# Patient Record
Sex: MALE | Race: WHITE | Employment: OTHER | URBAN - METROPOLITAN AREA
[De-identification: names, ages, dates, MRNs, and addresses within clinical notes are randomized per-mention and may not be internally consistent; named-entity substitution may affect disease eponyms.]

---

## 2017-01-06 ENCOUNTER — OFFICE VISIT (OUTPATIENT)
Dept: FAMILY MEDICINE CLINIC | Age: 72
End: 2017-01-06

## 2017-01-06 VITALS
TEMPERATURE: 97.7 F | HEART RATE: 64 BPM | RESPIRATION RATE: 20 BRPM | BODY MASS INDEX: 32.34 KG/M2 | HEIGHT: 73 IN | SYSTOLIC BLOOD PRESSURE: 138 MMHG | DIASTOLIC BLOOD PRESSURE: 86 MMHG | WEIGHT: 244 LBS

## 2017-01-06 DIAGNOSIS — I42.9 CARDIOMYOPATHY (HCC): ICD-10-CM

## 2017-01-06 DIAGNOSIS — E11.9 TYPE 2 DIABETES MELLITUS WITHOUT COMPLICATION, WITHOUT LONG-TERM CURRENT USE OF INSULIN (HCC): Primary | ICD-10-CM

## 2017-01-06 DIAGNOSIS — E78.5 HYPERLIPIDEMIA, UNSPECIFIED HYPERLIPIDEMIA TYPE: ICD-10-CM

## 2017-01-06 DIAGNOSIS — I25.2 MI, OLD: ICD-10-CM

## 2017-01-06 DIAGNOSIS — Z95.1 HISTORY OF HEART BYPASS SURGERY: ICD-10-CM

## 2017-01-06 DIAGNOSIS — I10 ESSENTIAL HYPERTENSION: ICD-10-CM

## 2017-01-06 DIAGNOSIS — E66.9 OBESITY, UNSPECIFIED OBESITY SEVERITY, UNSPECIFIED OBESITY TYPE: ICD-10-CM

## 2017-01-06 DIAGNOSIS — E11.9 TYPE 2 DIABETES MELLITUS WITHOUT COMPLICATION, WITHOUT LONG-TERM CURRENT USE OF INSULIN (HCC): ICD-10-CM

## 2017-01-06 LAB
CREATININE URINE: 139.3 MG/DL
MICROALBUMIN UR-MCNC: 5.5 MG/DL
MICROALBUMIN/CREAT UR-RTO: 39.5 MG/G (ref 0–30)

## 2017-01-06 PROCEDURE — 99213 OFFICE O/P EST LOW 20 MIN: CPT | Performed by: FAMILY MEDICINE

## 2017-01-06 PROCEDURE — 93000 ELECTROCARDIOGRAM COMPLETE: CPT | Performed by: FAMILY MEDICINE

## 2017-01-06 RX ORDER — LOSARTAN POTASSIUM 100 MG/1
TABLET ORAL
Qty: 90 TABLET | Refills: 2 | Status: SHIPPED | OUTPATIENT
Start: 2017-01-06 | End: 2017-09-15 | Stop reason: SDUPTHER

## 2017-01-06 RX ORDER — POTASSIUM CHLORIDE 20 MEQ/1
20 TABLET, EXTENDED RELEASE ORAL DAILY
Qty: 6090 TABLET | Refills: 3 | Status: SHIPPED | OUTPATIENT
Start: 2017-01-06 | End: 2018-01-13 | Stop reason: SDUPTHER

## 2017-01-06 ASSESSMENT — ENCOUNTER SYMPTOMS
EYE DISCHARGE: 0
EYE ITCHING: 0
DIARRHEA: 0
ABDOMINAL PAIN: 0
SINUS PRESSURE: 0
SHORTNESS OF BREATH: 0
SORE THROAT: 0
CONSTIPATION: 0
COUGH: 0

## 2017-01-06 ASSESSMENT — PATIENT HEALTH QUESTIONNAIRE - PHQ9
1. LITTLE INTEREST OR PLEASURE IN DOING THINGS: 0
SUM OF ALL RESPONSES TO PHQ9 QUESTIONS 1 & 2: 0
SUM OF ALL RESPONSES TO PHQ QUESTIONS 1-9: 0
2. FEELING DOWN, DEPRESSED OR HOPELESS: 0

## 2017-01-10 ENCOUNTER — TELEPHONE (OUTPATIENT)
Dept: FAMILY MEDICINE CLINIC | Age: 72
End: 2017-01-10

## 2017-01-10 RX ORDER — PREDNISONE 10 MG/1
TABLET ORAL
Qty: 51 TABLET | Refills: 0 | Status: SHIPPED | OUTPATIENT
Start: 2017-01-10 | End: 2017-01-20

## 2017-02-01 RX ORDER — PRAVASTATIN SODIUM 20 MG
20 TABLET ORAL DAILY
Qty: 90 TABLET | Refills: 2 | Status: SHIPPED | OUTPATIENT
Start: 2017-02-01 | End: 2018-01-13 | Stop reason: SDUPTHER

## 2017-06-02 ENCOUNTER — OFFICE VISIT (OUTPATIENT)
Dept: FAMILY MEDICINE CLINIC | Age: 72
End: 2017-06-02

## 2017-06-02 VITALS
BODY MASS INDEX: 30.09 KG/M2 | HEART RATE: 65 BPM | SYSTOLIC BLOOD PRESSURE: 124 MMHG | DIASTOLIC BLOOD PRESSURE: 82 MMHG | HEIGHT: 73 IN | RESPIRATION RATE: 16 BRPM | WEIGHT: 227 LBS | TEMPERATURE: 97.5 F

## 2017-06-02 DIAGNOSIS — I10 ESSENTIAL HYPERTENSION: ICD-10-CM

## 2017-06-02 DIAGNOSIS — E78.5 HYPERLIPIDEMIA, UNSPECIFIED HYPERLIPIDEMIA TYPE: ICD-10-CM

## 2017-06-02 DIAGNOSIS — Z90.79 H/O ABDOMINAL PROSTATECTOMY: ICD-10-CM

## 2017-06-02 DIAGNOSIS — E11.9 TYPE 2 DIABETES MELLITUS WITHOUT COMPLICATION, WITHOUT LONG-TERM CURRENT USE OF INSULIN (HCC): ICD-10-CM

## 2017-06-02 DIAGNOSIS — E11.9 TYPE 2 DIABETES MELLITUS WITHOUT COMPLICATION, WITHOUT LONG-TERM CURRENT USE OF INSULIN (HCC): Primary | ICD-10-CM

## 2017-06-02 LAB
ALBUMIN SERPL-MCNC: 4.6 G/DL (ref 3.9–4.9)
ALP BLD-CCNC: 76 U/L (ref 35–104)
ALT SERPL-CCNC: 25 U/L (ref 0–41)
ANION GAP SERPL CALCULATED.3IONS-SCNC: 13 MEQ/L (ref 7–13)
AST SERPL-CCNC: 33 U/L (ref 0–40)
BASOPHILS ABSOLUTE: 0 K/UL (ref 0–0.2)
BASOPHILS RELATIVE PERCENT: 0.5 %
BILIRUB SERPL-MCNC: 0.5 MG/DL (ref 0–1.2)
BUN BLDV-MCNC: 49 MG/DL (ref 8–23)
CALCIUM SERPL-MCNC: 9.6 MG/DL (ref 8.6–10.2)
CHLORIDE BLD-SCNC: 99 MEQ/L (ref 98–107)
CHOLESTEROL, TOTAL: 262 MG/DL (ref 0–199)
CO2: 24 MEQ/L (ref 22–29)
CREAT SERPL-MCNC: 1.48 MG/DL (ref 0.7–1.2)
CREATININE URINE: 323.5 MG/DL
EOSINOPHILS ABSOLUTE: 0.3 K/UL (ref 0–0.7)
EOSINOPHILS RELATIVE PERCENT: 4.9 %
GFR AFRICAN AMERICAN: 56.5
GFR NON-AFRICAN AMERICAN: 46.7
GLOBULIN: 2.4 G/DL (ref 2.3–3.5)
GLUCOSE BLD-MCNC: 83 MG/DL (ref 74–109)
HBA1C MFR BLD: 5.6 % (ref 4.8–5.9)
HCT VFR BLD CALC: 45.9 % (ref 42–52)
HDLC SERPL-MCNC: 55 MG/DL (ref 40–59)
HEMOGLOBIN: 15.2 G/DL (ref 14–18)
LDL CHOLESTEROL CALCULATED: 181 MG/DL (ref 0–129)
LYMPHOCYTES ABSOLUTE: 1.5 K/UL (ref 1–4.8)
LYMPHOCYTES RELATIVE PERCENT: 21.8 %
MCH RBC QN AUTO: 34.4 PG (ref 27–31.3)
MCHC RBC AUTO-ENTMCNC: 33 % (ref 33–37)
MCV RBC AUTO: 104.2 FL (ref 80–100)
MICROALBUMIN UR-MCNC: 3.6 MG/DL
MICROALBUMIN/CREAT UR-RTO: 11.1 MG/G (ref 0–30)
MONOCYTES ABSOLUTE: 0.8 K/UL (ref 0.2–0.8)
MONOCYTES RELATIVE PERCENT: 12.1 %
NEUTROPHILS ABSOLUTE: 4.1 K/UL (ref 1.4–6.5)
NEUTROPHILS RELATIVE PERCENT: 60.7 %
PDW BLD-RTO: 15.4 % (ref 11.5–14.5)
PLATELET # BLD: 170 K/UL (ref 130–400)
POTASSIUM SERPL-SCNC: 5.4 MEQ/L (ref 3.5–5.1)
PROSTATE SPECIFIC ANTIGEN: <0.01 NG/ML (ref 0–6.22)
RBC # BLD: 4.41 M/UL (ref 4.7–6.1)
SODIUM BLD-SCNC: 136 MEQ/L (ref 132–144)
TOTAL PROTEIN: 7 G/DL (ref 6.4–8.1)
TRIGL SERPL-MCNC: 128 MG/DL (ref 0–200)
WBC # BLD: 6.8 K/UL (ref 4.8–10.8)

## 2017-06-02 PROCEDURE — 3288F FALL RISK ASSESSMENT DOCD: CPT | Performed by: FAMILY MEDICINE

## 2017-06-02 PROCEDURE — 99214 OFFICE O/P EST MOD 30 MIN: CPT | Performed by: FAMILY MEDICINE

## 2017-06-02 RX ORDER — METOPROLOL TARTRATE 50 MG/1
TABLET, FILM COATED ORAL
Qty: 60 TABLET | Refills: 1 | Status: SHIPPED | OUTPATIENT
Start: 2017-06-02 | End: 2017-12-04 | Stop reason: SDUPTHER

## 2017-06-02 RX ORDER — METOPROLOL TARTRATE 50 MG/1
50 TABLET, FILM COATED ORAL 2 TIMES DAILY
Qty: 180 TABLET | Refills: 3 | Status: SHIPPED | OUTPATIENT
Start: 2017-06-02 | End: 2018-07-19 | Stop reason: SDUPTHER

## 2017-06-02 RX ORDER — LIDOCAINE 50 MG/G
1 PATCH TOPICAL DAILY
Qty: 30 PATCH | Refills: 0 | Status: SHIPPED | OUTPATIENT
Start: 2017-06-02 | End: 2018-07-05

## 2017-06-02 RX ORDER — FUROSEMIDE 40 MG/1
40 TABLET ORAL DAILY
Qty: 30 TABLET | Refills: 5 | Status: SHIPPED | OUTPATIENT
Start: 2017-06-02 | End: 2018-07-05 | Stop reason: ALTCHOICE

## 2017-06-02 ASSESSMENT — ENCOUNTER SYMPTOMS
ABDOMINAL PAIN: 0
SORE THROAT: 0
SINUS PRESSURE: 0
EYE ITCHING: 0
EYE DISCHARGE: 0
COUGH: 0
DIARRHEA: 0
SHORTNESS OF BREATH: 0
CONSTIPATION: 0

## 2017-07-25 ENCOUNTER — OFFICE VISIT (OUTPATIENT)
Dept: FAMILY MEDICINE CLINIC | Age: 72
End: 2017-07-25

## 2017-07-25 VITALS
TEMPERATURE: 97.9 F | OXYGEN SATURATION: 97 % | BODY MASS INDEX: 31.94 KG/M2 | HEIGHT: 73 IN | HEART RATE: 78 BPM | RESPIRATION RATE: 16 BRPM | DIASTOLIC BLOOD PRESSURE: 80 MMHG | SYSTOLIC BLOOD PRESSURE: 124 MMHG | WEIGHT: 241 LBS

## 2017-07-25 DIAGNOSIS — M25.512 ACUTE PAIN OF LEFT SHOULDER: Primary | ICD-10-CM

## 2017-07-25 PROCEDURE — 99213 OFFICE O/P EST LOW 20 MIN: CPT | Performed by: FAMILY MEDICINE

## 2017-07-25 RX ORDER — PREDNISONE 10 MG/1
TABLET ORAL
Qty: 51 TABLET | Refills: 0 | Status: SHIPPED | OUTPATIENT
Start: 2017-07-25 | End: 2017-08-04

## 2017-07-25 ASSESSMENT — ENCOUNTER SYMPTOMS
SHORTNESS OF BREATH: 0
WHEEZING: 0
NAUSEA: 0
SORE THROAT: 0
SINUS PRESSURE: 0
DIARRHEA: 0
CHEST TIGHTNESS: 0
RHINORRHEA: 0
COUGH: 0
ABDOMINAL PAIN: 0
VOMITING: 0

## 2017-08-08 ENCOUNTER — OFFICE VISIT (OUTPATIENT)
Dept: FAMILY MEDICINE CLINIC | Age: 72
End: 2017-08-08

## 2017-08-08 VITALS
HEART RATE: 72 BPM | BODY MASS INDEX: 31.94 KG/M2 | WEIGHT: 241 LBS | RESPIRATION RATE: 14 BRPM | SYSTOLIC BLOOD PRESSURE: 122 MMHG | HEIGHT: 73 IN | DIASTOLIC BLOOD PRESSURE: 74 MMHG | TEMPERATURE: 96.4 F

## 2017-08-08 DIAGNOSIS — G89.29 CHRONIC LEFT SHOULDER PAIN: Primary | ICD-10-CM

## 2017-08-08 DIAGNOSIS — M62.838 MUSCLE SPASM: ICD-10-CM

## 2017-08-08 DIAGNOSIS — M25.512 CHRONIC LEFT SHOULDER PAIN: Primary | ICD-10-CM

## 2017-08-08 DIAGNOSIS — M67.40 GANGLION CYST: ICD-10-CM

## 2017-08-08 DIAGNOSIS — M19.041 PRIMARY OSTEOARTHRITIS OF RIGHT HAND: ICD-10-CM

## 2017-08-08 PROCEDURE — 99214 OFFICE O/P EST MOD 30 MIN: CPT | Performed by: FAMILY MEDICINE

## 2017-08-08 RX ORDER — PREDNISONE 10 MG/1
TABLET ORAL
Qty: 51 TABLET | Refills: 0 | Status: SHIPPED | OUTPATIENT
Start: 2017-08-08 | End: 2017-08-18

## 2017-08-08 ASSESSMENT — ENCOUNTER SYMPTOMS
CHEST TIGHTNESS: 0
COUGH: 0
SORE THROAT: 0
NAUSEA: 0
SHORTNESS OF BREATH: 0
SINUS PRESSURE: 0
ABDOMINAL PAIN: 0
VOMITING: 0
DIARRHEA: 0
WHEEZING: 0
RHINORRHEA: 0

## 2017-08-11 ENCOUNTER — TELEPHONE (OUTPATIENT)
Dept: FAMILY MEDICINE CLINIC | Age: 72
End: 2017-08-11

## 2017-09-15 RX ORDER — LOSARTAN POTASSIUM 100 MG/1
TABLET ORAL
Qty: 90 TABLET | Refills: 3 | Status: SHIPPED | OUTPATIENT
Start: 2017-09-15 | End: 2018-08-30 | Stop reason: SDUPTHER

## 2017-10-30 LAB
BASOPHILS ABSOLUTE: NORMAL /ΜL
BASOPHILS RELATIVE PERCENT: NORMAL %
EOSINOPHILS ABSOLUTE: NORMAL /ΜL
EOSINOPHILS RELATIVE PERCENT: NORMAL %
HCT VFR BLD CALC: 41.5 % (ref 41–53)
HEMOGLOBIN: 13.6 G/DL (ref 13.5–17.5)
LYMPHOCYTES ABSOLUTE: NORMAL /ΜL
LYMPHOCYTES RELATIVE PERCENT: NORMAL %
MCH RBC QN AUTO: NORMAL PG
MCHC RBC AUTO-ENTMCNC: NORMAL G/DL
MCV RBC AUTO: NORMAL FL
MONOCYTES ABSOLUTE: NORMAL /ΜL
MONOCYTES RELATIVE PERCENT: NORMAL %
NEUTROPHILS ABSOLUTE: NORMAL /ΜL
NEUTROPHILS RELATIVE PERCENT: NORMAL %
PDW BLD-RTO: NORMAL %
PLATELET # BLD: 220 K/ΜL
PMV BLD AUTO: NORMAL FL
RBC # BLD: 4.1 10^6/ΜL
WBC # BLD: 6.5 10^3/ML

## 2017-12-04 ENCOUNTER — OFFICE VISIT (OUTPATIENT)
Dept: FAMILY MEDICINE CLINIC | Age: 72
End: 2017-12-04

## 2017-12-04 VITALS
HEART RATE: 72 BPM | HEIGHT: 73 IN | TEMPERATURE: 98.2 F | RESPIRATION RATE: 16 BRPM | WEIGHT: 242 LBS | BODY MASS INDEX: 32.07 KG/M2 | SYSTOLIC BLOOD PRESSURE: 132 MMHG | DIASTOLIC BLOOD PRESSURE: 82 MMHG

## 2017-12-04 DIAGNOSIS — R60.9 SWELLING: ICD-10-CM

## 2017-12-04 DIAGNOSIS — M79.18 RIGHT BUTTOCK PAIN: Primary | ICD-10-CM

## 2017-12-04 PROCEDURE — 99213 OFFICE O/P EST LOW 20 MIN: CPT | Performed by: FAMILY MEDICINE

## 2017-12-04 ASSESSMENT — ENCOUNTER SYMPTOMS
SINUS PRESSURE: 0
EYE ITCHING: 0
SORE THROAT: 0
CONSTIPATION: 0
EYE DISCHARGE: 0
SHORTNESS OF BREATH: 0
DIARRHEA: 0
ABDOMINAL PAIN: 0
COUGH: 0

## 2017-12-04 NOTE — PROGRESS NOTES
Subjective  Moustapha Hernandez, 67 y.o. male presents today with:  Chief Complaint   Patient presents with    Fall     fell down stairs 11/25/17, point of impact: rt buttock. Area is very swollen and bruied. Pain is moderate to severe            HPI    Patient slipped and went straight down on his right buttock cheek on steps 9 days ago still has pain tenderness and a large swollen area. No other questions and or concerns for today's visit      Review of Systems   Constitutional: Negative for appetite change, fatigue and fever. HENT: Negative for congestion, ear pain, sinus pressure and sore throat. Eyes: Negative for discharge and itching. Respiratory: Negative for cough and shortness of breath. Cardiovascular: Negative for chest pain and palpitations. Gastrointestinal: Negative for abdominal pain, constipation and diarrhea. Endocrine: Negative for polydipsia. Genitourinary: Negative for difficulty urinating. Musculoskeletal: Positive for arthralgias and myalgias. Negative for gait problem. Skin: Negative. Neurological: Negative for dizziness. Hematological: Negative. Psychiatric/Behavioral: Negative.           Past Medical History:   Diagnosis Date    Anxiety     BPH (benign prostatic hypertrophy)     BPH (benign prostatic hypertrophy) 4/27/2015    Cervical spinal stenosis 3,4, and 5 6/21/2013    ETOH abuse     Gout     History of heart bypass surgery 8/10/2012    Hyperlipidemia     Hyperlipidemia 1/6/2017    Hypertension     Hypogonadism     MI, old     discovered MI in 2012, occured prior to 2007    Obesity     Type II or unspecified type diabetes mellitus without mention of complication, not stated as uncontrolled      Past Surgical History:   Procedure Laterality Date    APPENDECTOMY      BACK SURGERY  05/15/14    Spinal stenosis, laminectomy partial L1 complete L2 L3 L4 partial L5    BICEPS TENDON REPAIR      BILAT RUPTURE    CARDIAC CATHETERIZATION 11/06/2002    LEFT    CARDIAC SURGERY  07-19-12    [facility], [facility] 5 Dr. Della Patricio  9/18/2013    CATARACT REMOVAL WITH IMPLANT  11/2009    RIGHT    CATARACT REMOVAL WITH IMPLANT  02/2017    left eye    COLONOSCOPY  1997    COLONOSCOPY  06/04/15    DR Arlene Mendoza    5 YRS    CRUCIATE LIGAMENT REPAIR  09/14/15    DR Linden Wright    KNEE SURGERY  09/2015    left knee partial knee replacement    OTHER SURGICAL HISTORY  11/8/2013    EPIDURAL STEROID INJ./LUMBAR     PROSTATE SURGERY      prostate removal     PROSTATECTOMY  02/01/16    DR Fidencio Perdue RETINAL DETACHMENT SURGERY      right eye     SHOULDER SURGERY      [facility]     Social History     Social History    Marital status:      Spouse name: Marysue Favre     Number of children: N/A    Years of education: N/A     Occupational History     Retired     Social History Main Topics    Smoking status: Never Smoker    Smokeless tobacco: Never Used    Alcohol use Yes    Drug use: No    Sexual activity: Not on file     Other Topics Concern    Not on file     Social History Narrative    No narrative on file     Family History   Problem Relation Age of Onset    Heart Disease Father      MI     Allergies   Allergen Reactions    Shellfish Allergy      Other reaction(s): Vomiting    Statins Other (See Comments)     Lipitor 40 mg caused severe muscle pains , weakness occurred after 5 years of taking the medication and became significantly worse. Pain corrected immediately after stopping Lipitor.      Sulfa Antibiotics Rash     Skin irritation     Current Outpatient Prescriptions   Medication Sig Dispense Refill    losartan (COZAAR) 100 MG tablet TAKE 1 TABLET DAILY 90 tablet 3    furosemide (LASIX) 40 MG tablet Take 1 tablet by mouth daily One tablet weekly 30 tablet 5    metoprolol tartrate (LOPRESSOR) 50 MG tablet TAKE 1 TABLET TWICE A DAY 60 tablet 1    metoprolol tartrate (LOPRESSOR) 50 MG tablet Take 1 tablet by mouth 2 times daily 180 tablet 3    lidocaine (LIDODERM) 5 % Place 1 patch onto the skin daily 12 hours on, 12 hours off. 30 patch 0    pravastatin (PRAVACHOL) 20 MG tablet Take 1 tablet by mouth daily 90 tablet 2    potassium chloride (KLOR-CON M) 20 MEQ extended release tablet Take 1 tablet by mouth daily 6090 tablet 3    Apoaequorin (PREVAGEN) 10 MG CAPS Take 10 mg by mouth daily 30 capsule 3    allopurinol (ZYLOPRIM) 300 MG tablet TAKE 1 TABLET DAILY 90 tablet 1    Magnesium 500 MG TABS Take by mouth 2 times daily      aspirin 81 MG tablet Take 81 mg by mouth 2 times daily.  KLOR-CON M20 20 MEQ tablet TAKE 1 TABLET DAILY 90 tablet 2    Plant Sterols and Stanols (CHOLEST OFF PO) Take 900 mg by mouth 2 times daily.  Cholecalciferol (VITAMIN D-3) 5000 UNITS TABS Take  by mouth.  glucose blood VI test strips ([employer] CONTOUR TEST) strip by In Vitro route as needed. Test bid       MICROLET LANCETS MISC by Does not apply route. bid         fish oil-omega-3 fatty acids 1000 MG capsule Take 1 g by mouth every other day.  Multiple Vitamin (MULTIVITAMIN PO) Take  by mouth daily. No current facility-administered medications for this visit. PMH, Surgical Hx, Family Hx, and Social Hx reviewed and updated. Health Maintenance reviewed. Objective    There were no vitals filed for this visit. Physical Exam   Constitutional: He is oriented to person, place, and time. He appears well-developed and well-nourished. HENT:   Head: Normocephalic and atraumatic. Right Ear: External ear normal.   Left Ear: External ear normal.   Mouth/Throat: Oropharynx is clear and moist.   Eyes: Conjunctivae and EOM are normal. Pupils are equal, round, and reactive to light. Neck: Normal range of motion. Neck supple. No JVD present. No thyromegaly present. Cardiovascular: Normal rate, regular rhythm, normal heart sounds and intact distal pulses. Exam reveals no gallop and no friction rub.     No murmur heard.  Pulmonary/Chest: Effort normal and breath sounds normal. No respiratory distress. Abdominal: Bowel sounds are normal. He exhibits no mass. There is no tenderness. Musculoskeletal: Normal range of motion. Neurological: He is alert and oriented to person, place, and time. Skin: Skin is warm and dry. Psychiatric: He has a normal mood and affect. His behavior is normal.     Physical exam the right buttock is bruised this happened injury up in 9 days ago he has a large softball size ballotable collection it is non-transilluminating. I suspect that this is a large collection of blood. We'll switch causing the continued pain. Patient states that this is not shrunk down appreciably since she didn't day after the injury. He would prefer to go to the clinic clinic we will contact her surgery Department and see who we can get him in to see as soon as possible and will probably need to be drained that may be semi-soft clot in which case we'll have to be opened up. We'll await surgeon's evaluation of follow-up with him after the procedure. Assessment & Plan   1. Right buttock pain     2. Swelling       No orders of the defined types were placed in this encounter. No orders of the defined types were placed in this encounter. Medications Discontinued During This Encounter   Medication Reason    metoprolol tartrate (LOPRESSOR) 50 MG tablet Duplicate Order     No Follow-up on file.         Controlled Substances Monitoring:          Amadeo Zaragoza MD          \

## 2018-01-15 RX ORDER — POTASSIUM CHLORIDE 20 MEQ/1
TABLET, EXTENDED RELEASE ORAL
Qty: 6090 TABLET | Refills: 3 | Status: ON HOLD | OUTPATIENT
Start: 2018-01-15 | End: 2018-07-31

## 2018-01-15 RX ORDER — PRAVASTATIN SODIUM 20 MG
TABLET ORAL
Qty: 90 TABLET | Refills: 2 | Status: SHIPPED | OUTPATIENT
Start: 2018-01-15 | End: 2019-02-18 | Stop reason: SDUPTHER

## 2018-01-23 ENCOUNTER — TELEPHONE (OUTPATIENT)
Dept: FAMILY MEDICINE CLINIC | Age: 73
End: 2018-01-23

## 2018-01-23 RX ORDER — PREDNISONE 10 MG/1
TABLET ORAL
Qty: 51 TABLET | Refills: 0 | Status: SHIPPED | OUTPATIENT
Start: 2018-01-23 | End: 2018-02-02

## 2018-07-05 ENCOUNTER — OFFICE VISIT (OUTPATIENT)
Dept: FAMILY MEDICINE CLINIC | Age: 73
End: 2018-07-05
Payer: MEDICARE

## 2018-07-05 VITALS
SYSTOLIC BLOOD PRESSURE: 138 MMHG | RESPIRATION RATE: 18 BRPM | TEMPERATURE: 97 F | BODY MASS INDEX: 32.15 KG/M2 | WEIGHT: 242.6 LBS | DIASTOLIC BLOOD PRESSURE: 88 MMHG | HEIGHT: 73 IN | HEART RATE: 88 BPM

## 2018-07-05 DIAGNOSIS — K52.9 CHRONIC DIARRHEA: ICD-10-CM

## 2018-07-05 DIAGNOSIS — C61 PROSTATE CANCER (HCC): ICD-10-CM

## 2018-07-05 DIAGNOSIS — Z00.00 ROUTINE GENERAL MEDICAL EXAMINATION AT A HEALTH CARE FACILITY: Primary | ICD-10-CM

## 2018-07-05 DIAGNOSIS — I25.10 ATHEROSCLEROSIS OF CORONARY ARTERY, ANGINA PRESENCE UNSPECIFIED, UNSPECIFIED VESSEL OR LESION TYPE, UNSPECIFIED WHETHER NATIVE OR TRANSPLANTED HEART: ICD-10-CM

## 2018-07-05 DIAGNOSIS — E11.9 TYPE 2 DIABETES MELLITUS WITHOUT COMPLICATION, WITHOUT LONG-TERM CURRENT USE OF INSULIN (HCC): ICD-10-CM

## 2018-07-05 DIAGNOSIS — Z12.11 SCREENING FOR COLON CANCER: ICD-10-CM

## 2018-07-05 DIAGNOSIS — I10 ESSENTIAL HYPERTENSION: ICD-10-CM

## 2018-07-05 LAB
ALBUMIN SERPL-MCNC: 4 G/DL (ref 3.9–4.9)
ALP BLD-CCNC: 77 U/L (ref 35–104)
ALT SERPL-CCNC: 21 U/L (ref 0–41)
ANION GAP SERPL CALCULATED.3IONS-SCNC: 12 MEQ/L (ref 7–13)
AST SERPL-CCNC: 22 U/L (ref 0–40)
BASOPHILS ABSOLUTE: 0 K/UL (ref 0–0.2)
BASOPHILS RELATIVE PERCENT: 0.6 %
BILIRUB SERPL-MCNC: 0.5 MG/DL (ref 0–1.2)
BUN BLDV-MCNC: 20 MG/DL (ref 8–23)
CALCIUM SERPL-MCNC: 9.5 MG/DL (ref 8.6–10.2)
CHLORIDE BLD-SCNC: 103 MEQ/L (ref 98–107)
CHOLESTEROL, TOTAL: 232 MG/DL (ref 0–199)
CO2: 27 MEQ/L (ref 22–29)
CREAT SERPL-MCNC: 0.86 MG/DL (ref 0.7–1.2)
CREATININE URINE: 188.6 MG/DL
EOSINOPHILS ABSOLUTE: 0.3 K/UL (ref 0–0.7)
EOSINOPHILS RELATIVE PERCENT: 5 %
GFR AFRICAN AMERICAN: >60
GFR NON-AFRICAN AMERICAN: >60
GLOBULIN: 2.7 G/DL (ref 2.3–3.5)
GLUCOSE BLD-MCNC: 117 MG/DL (ref 74–109)
HBA1C MFR BLD: 6.5 % (ref 4.8–5.9)
HCT VFR BLD CALC: 42.3 % (ref 42–52)
HDLC SERPL-MCNC: 59 MG/DL (ref 40–59)
HEMOGLOBIN: 14.7 G/DL (ref 14–18)
LDL CHOLESTEROL CALCULATED: 150 MG/DL (ref 0–129)
LYMPHOCYTES ABSOLUTE: 1.8 K/UL (ref 1–4.8)
LYMPHOCYTES RELATIVE PERCENT: 28.3 %
MCH RBC QN AUTO: 34.8 PG (ref 27–31.3)
MCHC RBC AUTO-ENTMCNC: 34.8 % (ref 33–37)
MCV RBC AUTO: 99.9 FL (ref 80–100)
MICROALBUMIN UR-MCNC: 27.9 MG/DL
MICROALBUMIN/CREAT UR-RTO: 147.9 MG/G (ref 0–30)
MONOCYTES ABSOLUTE: 0.7 K/UL (ref 0.2–0.8)
MONOCYTES RELATIVE PERCENT: 11.6 %
NEUTROPHILS ABSOLUTE: 3.4 K/UL (ref 1.4–6.5)
NEUTROPHILS RELATIVE PERCENT: 54.5 %
PDW BLD-RTO: 14.4 % (ref 11.5–14.5)
PLATELET # BLD: 197 K/UL (ref 130–400)
POTASSIUM SERPL-SCNC: 4.4 MEQ/L (ref 3.5–5.1)
PROSTATE SPECIFIC ANTIGEN: 0.01 NG/ML (ref 0–6.22)
RBC # BLD: 4.23 M/UL (ref 4.7–6.1)
SODIUM BLD-SCNC: 142 MEQ/L (ref 132–144)
TOTAL PROTEIN: 6.7 G/DL (ref 6.4–8.1)
TRIGL SERPL-MCNC: 115 MG/DL (ref 0–200)
WBC # BLD: 6.2 K/UL (ref 4.8–10.8)

## 2018-07-05 PROCEDURE — 99397 PER PM REEVAL EST PAT 65+ YR: CPT | Performed by: FAMILY MEDICINE

## 2018-07-05 RX ORDER — DONEPEZIL HYDROCHLORIDE 10 MG/1
10 TABLET, FILM COATED ORAL NIGHTLY
Qty: 30 TABLET | Refills: 3 | Status: SHIPPED | OUTPATIENT
Start: 2018-07-05 | End: 2018-09-26

## 2018-07-05 ASSESSMENT — ENCOUNTER SYMPTOMS
SINUS PRESSURE: 0
EYE ITCHING: 0
ABDOMINAL PAIN: 0
EYE DISCHARGE: 0
SORE THROAT: 0
SHORTNESS OF BREATH: 0
DIARRHEA: 0
CONSTIPATION: 0

## 2018-07-05 NOTE — PROGRESS NOTES
Panel    Lipid Panel    Hemoglobin A1C    Microalbumin / Creatinine Urine Ratio   2. Essential hypertension  CBC With Auto Differential    Comprehensive Metabolic Panel    Lipid Panel   3. Atherosclerosis of coronary artery, angina presence unspecified, unspecified vessel or lesion type, unspecified whether native or transplanted heart  CBC With Auto Differential    Comprehensive Metabolic Panel    Lipid Panel   4. Screening for colon cancer  [address] [place] [address])   5. Prostate cancer (HCC)  PSA, Diagnostic   6.  Chronic diarrhea  Amb External Referral To Gastroenterology     Orders Placed This Encounter   Procedures    CBC With Auto Differential     Standing Status:   Future     Number of Occurrences:   1     Standing Expiration Date:   7/5/2019    Comprehensive Metabolic Panel     Standing Status:   Future     Number of Occurrences:   1     Standing Expiration Date:   7/5/2019    Lipid Panel     Standing Status:   Future     Number of Occurrences:   1     Standing Expiration Date:   7/5/2019     Order Specific Question:   Is Patient Fasting?/# of Hours     Answer:   yes/8    Hemoglobin A1C     Standing Status:   Future     Number of Occurrences:   1     Standing Expiration Date:   7/5/2019    Microalbumin / Creatinine Urine Ratio     Standing Status:   Future     Number of Occurrences:   1     Standing Expiration Date:   7/5/2019    PSA, Diagnostic     Standing Status:   Future     Number of Occurrences:   1     Standing Expiration Date:   7/5/2019   [address] [place] [address]     Referral Priority:   Routine     Referral Type:   Eval and Treat     Referral Reason:   Specialty Services Required     Requested Specialty:   Gastroenterology     Number of Visits Requested:   1    Amb External Referral To Gastroenterology     Referral Priority:   Routine     Referral Type:   Consult for Advice and Opinion     Referral Reason: Specialty Services Required     Referred to Provider:   Enio Holm MD     Requested Specialty:   Gastroenterology     Number of Visits Requested:   1     Orders Placed This Encounter   Medications    donepezil (ARICEPT) 10 MG tablet     Sig: Take 1 tablet by mouth nightly     Dispense:  30 tablet     Refill:  3     Medications Discontinued During This Encounter   Medication Reason    furosemide (LASIX) 40 MG tablet Therapy completed    lidocaine (LIDODERM) 5 % LIST CLEANUP    Multiple Vitamin (MULTIVITAMIN PO) LIST CLEANUP     No Follow-up on file. Controlled Substances Monitoring:     No flowsheet data found.     Farhana Oakes MD

## 2018-07-19 RX ORDER — METOPROLOL TARTRATE 50 MG/1
50 TABLET, FILM COATED ORAL 2 TIMES DAILY
Qty: 180 TABLET | Refills: 3 | Status: SHIPPED | OUTPATIENT
Start: 2018-07-19

## 2018-07-31 ENCOUNTER — ANESTHESIA (OUTPATIENT)
Dept: ENDOSCOPY | Age: 73
End: 2018-07-31
Payer: MEDICARE

## 2018-07-31 ENCOUNTER — ANESTHESIA EVENT (OUTPATIENT)
Dept: ENDOSCOPY | Age: 73
End: 2018-07-31
Payer: MEDICARE

## 2018-07-31 ENCOUNTER — HOSPITAL ENCOUNTER (OUTPATIENT)
Age: 73
Setting detail: OUTPATIENT SURGERY
Discharge: HOME | End: 2018-07-31
Attending: SPECIALIST | Admitting: SPECIALIST
Payer: MEDICARE

## 2018-07-31 VITALS
RESPIRATION RATE: 16 BRPM | OXYGEN SATURATION: 99 % | HEART RATE: 50 BPM | BODY MASS INDEX: 31.28 KG/M2 | TEMPERATURE: 97.6 F | DIASTOLIC BLOOD PRESSURE: 74 MMHG | SYSTOLIC BLOOD PRESSURE: 126 MMHG | HEIGHT: 73 IN | WEIGHT: 236 LBS

## 2018-07-31 VITALS
SYSTOLIC BLOOD PRESSURE: 156 MMHG | DIASTOLIC BLOOD PRESSURE: 74 MMHG | RESPIRATION RATE: 20 BRPM | OXYGEN SATURATION: 99 %

## 2018-07-31 PROCEDURE — 88305 TISSUE EXAM BY PATHOLOGIST: CPT

## 2018-07-31 PROCEDURE — 3700000001 HC ADD 15 MINUTES (ANESTHESIA): Performed by: SPECIALIST

## 2018-07-31 PROCEDURE — 3700000000 HC ANESTHESIA ATTENDED CARE: Performed by: SPECIALIST

## 2018-07-31 PROCEDURE — 7100000011 HC PHASE II RECOVERY - ADDTL 15 MIN: Performed by: SPECIALIST

## 2018-07-31 PROCEDURE — 7100000010 HC PHASE II RECOVERY - FIRST 15 MIN: Performed by: SPECIALIST

## 2018-07-31 PROCEDURE — 6360000002 HC RX W HCPCS: Performed by: NURSE ANESTHETIST, CERTIFIED REGISTERED

## 2018-07-31 PROCEDURE — 2580000003 HC RX 258: Performed by: SPECIALIST

## 2018-07-31 PROCEDURE — 3609027000 HC COLONOSCOPY: Performed by: SPECIALIST

## 2018-07-31 RX ORDER — SODIUM CHLORIDE 9 MG/ML
INJECTION, SOLUTION INTRAVENOUS CONTINUOUS
Status: DISCONTINUED | OUTPATIENT
Start: 2018-07-31 | End: 2018-07-31 | Stop reason: HOSPADM

## 2018-07-31 RX ORDER — LIDOCAINE HYDROCHLORIDE 10 MG/ML
1 INJECTION, SOLUTION EPIDURAL; INFILTRATION; INTRACAUDAL; PERINEURAL
Status: DISCONTINUED | OUTPATIENT
Start: 2018-07-31 | End: 2018-07-31 | Stop reason: HOSPADM

## 2018-07-31 RX ORDER — SODIUM CHLORIDE 0.9 % (FLUSH) 0.9 %
10 SYRINGE (ML) INJECTION EVERY 12 HOURS SCHEDULED
Status: DISCONTINUED | OUTPATIENT
Start: 2018-07-31 | End: 2018-07-31 | Stop reason: HOSPADM

## 2018-07-31 RX ORDER — SODIUM CHLORIDE 0.9 % (FLUSH) 0.9 %
10 SYRINGE (ML) INJECTION PRN
Status: DISCONTINUED | OUTPATIENT
Start: 2018-07-31 | End: 2018-07-31 | Stop reason: HOSPADM

## 2018-07-31 RX ORDER — VIT C/B6/B5/MAGNESIUM/HERB 173 50-5-6-5MG
500 CAPSULE ORAL DAILY
COMMUNITY

## 2018-07-31 RX ORDER — PROPOFOL 10 MG/ML
INJECTION, EMULSION INTRAVENOUS PRN
Status: DISCONTINUED | OUTPATIENT
Start: 2018-07-31 | End: 2018-07-31 | Stop reason: SDUPTHER

## 2018-07-31 RX ORDER — M-VIT,TX,IRON,MINS/CALC/FOLIC 27MG-0.4MG
1 TABLET ORAL DAILY
COMMUNITY

## 2018-07-31 RX ORDER — GLUCOSAMINE HCL 500 MG
400 TABLET ORAL DAILY
COMMUNITY

## 2018-07-31 RX ADMIN — PROPOFOL 70 MG: 10 INJECTION, EMULSION INTRAVENOUS at 13:15

## 2018-07-31 RX ADMIN — SODIUM CHLORIDE: 9 INJECTION, SOLUTION INTRAVENOUS at 12:19

## 2018-07-31 RX ADMIN — PROPOFOL 200 MG: 10 INJECTION, EMULSION INTRAVENOUS at 13:03

## 2018-07-31 NOTE — ANESTHESIA PRE PROCEDURE
Department of Anesthesiology  Preprocedure Note       Name:  Mikey Aldrich   Age:  68 y.o.  :  1945                                          MRN:  [id number]         Date:  2018      Surgeon: Viktoriya Santos):  Pat Ram MD    Procedure: Procedure(s):  COLONOSCOPY    Medications prior to admission:   Prior to Admission medications    Medication Sig Start Date End Date Taking? Authorizing Provider   Probiotic Product (PROBIOTIC-10 PO) Take 1 tablet by mouth daily   Yes Historical Provider, MD   psyllium 0.52 g capsule Take 0.52 g by mouth daily   Yes Historical Provider, MD   metoprolol tartrate (LOPRESSOR) 50 MG tablet Take 1 tablet by mouth 2 times daily 18  Yes Ant Chery MD   donepezil (ARICEPT) 10 MG tablet Take 1 tablet by mouth nightly 18  Yes Ant Chery MD   pravastatin (PRAVACHOL) 20 MG tablet TAKE 1 TABLET DAILY 1/15/18  Yes Robert Azul MD   losartan (COZAAR) 100 MG tablet TAKE 1 TABLET DAILY 9/15/17  Yes Ant Chery MD   Apoaequorin (PREVAGEN) 10 MG CAPS Take 10 mg by mouth daily 16  Yes Ant Chery MD   Magnesium 500 MG TABS Take by mouth 2 times daily   Yes Historical Provider, MD   KLOR-CON M20 20 MEQ tablet TAKE 1 TABLET DAILY 14  Yes Ant Chery MD   Plant Sterols and Stanols (CHOLEST OFF PO) Take 900 mg by mouth 2 times daily. Yes Historical Provider, MD   Cholecalciferol (VITAMIN D-3) 5000 UNITS TABS Take  by mouth. Yes Historical Provider, MD   fish oil-omega-3 fatty acids 1000 MG capsule Take 1 g by mouth every other day. Yes Historical Provider, MD   aspirin 81 MG tablet Take 81 mg by mouth 2 times daily. Historical Provider, MD   glucose blood VI test strips ([employer] CONTOUR TEST) strip by In Vitro route as needed. Test bid     Historical Provider, MD   MICROLET LANCETS MISC by Does not apply route.  bid       Historical Provider, MD       Current medications:    Current Facility-Administered Medications   Medication Dose Route Frequency Provider Last Rate Last Dose    0.9 % sodium chloride infusion   Intravenous Continuous Bharati Hitchcock MD        sodium chloride flush 0.9 % injection 10 mL  10 mL Intravenous 2 times per day Bharati Hitchcock MD        sodium chloride flush 0.9 % injection 10 mL  10 mL Intravenous PRN Bharati Hitchcock MD        lidocaine PF 1 % injection 1 mL  1 mL Intradermal Once PRN Bharati Hitchcock MD           Allergies: Allergies   Allergen Reactions    Shellfish Allergy      Other reaction(s): Vomiting    Statins Other (See Comments)     Lipitor 40 mg caused severe muscle pains , weakness occurred after 5 years of taking the medication and became significantly worse. Pain corrected immediately after stopping Lipitor.      Sulfa Antibiotics Rash     Skin irritation       Problem List:    Patient Active Problem List   Diagnosis Code    Hypertension I10    Gout M10.9    Type 2 diabetes mellitus without complication (HCC) D66.5    Anxiety F41.9    Hypogonadism     NASIMA (obstructive sleep apnea) G47.33    Family history of premature CAD Z82.49    Cardiomyopathy ([address] I42.9    MI, old I25.2    History of heart bypass surgery Z95.1    Cervical spinal stenosis 3,4, and 5 M48.02    Pulmonary collapse J98.19    Hypertrophy of prostate without urinary obstruction and other lower urinary tract symptoms (LUTS) N40.0    Coronary atherosclerosis I25.10    Other specified counseling Z71.89    Lumbago M54.5    Obesity E66.9    Obstructive sleep apnea G47.33    Osteoarthritis M19.90    Rotator cuff (capsule) sprain S43.429A    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    Other drug allergy(995.27) Z88.8    Vitamin D deficiency E55.9    Benign prostatic hyperplasia N40.0    Hyperlipidemia E78.5       Past Medical History:        Diagnosis Date    Anxiety     BPH (benign prostatic hypertrophy)     BPH (benign prostatic hypertrophy) 4/27/2015    CAD (coronary artery disease)     Cervical spinal stenosis 3,4, and 5 6/21/2013    ETOH abuse     Gout     History of heart bypass surgery 8/10/2012    Hyperlipidemia     Hyperlipidemia 1/6/2017    Hypertension     Hypogonadism     MI, old     discovered MI in 2012, occured prior to 2007    Obesity        Past Surgical History:        Procedure Laterality Date    APPENDECTOMY      BACK SURGERY  05/15/14    Spinal stenosis, laminectomy partial L1 complete L2 L3 L4 partial L5    CARDIAC CATHETERIZATION  11/06/2002    LEFT    CARDIAC SURGERY  07-19-12    [facility], bypass x 5 Dr. Drew Bounds  9/18/2013    CATARACT REMOVAL WITH IMPLANT  11/2009    RIGHT    CATARACT REMOVAL WITH IMPLANT  02/2017    left eye    COLONOSCOPY  1997    COLONOSCOPY  06/04/15     [address]    5 YRS    CRUCIATE LIGAMENT REPAIR  09/14/15    DR Meseret Vivas    KNEE SURGERY  09/2015    left knee partial knee replacement    OTHER SURGICAL HISTORY  11/8/2013    EPIDURAL STEROID INJ./LUMBAR     OTHER SURGICAL HISTORY      hematoma evacuation right buttocks    PROSTATE SURGERY      prostate removal     PROSTATECTOMY  02/01/16    DR Lenore Huggins RETINAL DETACHMENT SURGERY      right eye     SHOULDER SURGERY      [facility]       Social History:    Social History   Substance Use Topics    Smoking status: Never Smoker    Smokeless tobacco: Never Used    Alcohol use 8.4 oz/week     14 Shots of liquor per week                                Counseling given: Not Answered      Vital Signs (Current): There were no vitals filed for this visit.                                            BP Readings from Last 3 Encounters:   07/05/18 138/88   12/04/17 132/82   08/08/17 122/74       NPO Status: Time of last liquid consumption: 0930                        Time of last solid consumption: 1500                        Date of last liquid consumption: 07/31/18                        Date of last solid food consumption: 07/29/18    BMI:   Wt Readings from Last 3

## 2018-08-30 RX ORDER — LOSARTAN POTASSIUM 100 MG/1
TABLET ORAL
Qty: 90 TABLET | Refills: 3 | Status: SHIPPED | OUTPATIENT
Start: 2018-08-30 | End: 2019-03-18 | Stop reason: ALTCHOICE

## 2018-09-26 ENCOUNTER — OFFICE VISIT (OUTPATIENT)
Dept: FAMILY MEDICINE CLINIC | Age: 73
End: 2018-09-26
Payer: MEDICARE

## 2018-09-26 VITALS
TEMPERATURE: 98.2 F | HEIGHT: 73 IN | BODY MASS INDEX: 32.74 KG/M2 | WEIGHT: 247 LBS | HEART RATE: 54 BPM | DIASTOLIC BLOOD PRESSURE: 76 MMHG | RESPIRATION RATE: 14 BRPM | SYSTOLIC BLOOD PRESSURE: 134 MMHG

## 2018-09-26 DIAGNOSIS — R41.3 MEMORY LOSS: ICD-10-CM

## 2018-09-26 DIAGNOSIS — Z23 NEED FOR INFLUENZA VACCINATION: ICD-10-CM

## 2018-09-26 DIAGNOSIS — R10.32 LLQ PAIN: Primary | ICD-10-CM

## 2018-09-26 DIAGNOSIS — N39.498 OTHER URINARY INCONTINENCE: ICD-10-CM

## 2018-09-26 DIAGNOSIS — R10.32 ABDOMINAL PAIN, LEFT LOWER QUADRANT: ICD-10-CM

## 2018-09-26 LAB
BILIRUBIN, POC: NORMAL
BLOOD URINE, POC: NORMAL
CLARITY, POC: CLEAR
COLOR, POC: NORMAL
GLUCOSE URINE, POC: NORMAL
KETONES, POC: NORMAL
LEUKOCYTE EST, POC: NORMAL
NITRITE, POC: NORMAL
PH, POC: 6
PROTEIN, POC: NORMAL
SPECIFIC GRAVITY, POC: 1.02
UROBILINOGEN, POC: NORMAL

## 2018-09-26 PROCEDURE — 90662 IIV NO PRSV INCREASED AG IM: CPT | Performed by: FAMILY MEDICINE

## 2018-09-26 PROCEDURE — G0008 ADMIN INFLUENZA VIRUS VAC: HCPCS | Performed by: FAMILY MEDICINE

## 2018-09-26 PROCEDURE — 99214 OFFICE O/P EST MOD 30 MIN: CPT | Performed by: FAMILY MEDICINE

## 2018-09-26 PROCEDURE — 81003 URINALYSIS AUTO W/O SCOPE: CPT | Performed by: FAMILY MEDICINE

## 2018-09-26 RX ORDER — DONEPEZIL HYDROCHLORIDE 10 MG/1
TABLET, FILM COATED ORAL
Qty: 60 TABLET | Refills: 3 | Status: SHIPPED | OUTPATIENT
Start: 2018-09-26 | End: 2019-03-18 | Stop reason: SDUPTHER

## 2018-09-26 ASSESSMENT — ENCOUNTER SYMPTOMS
CONSTIPATION: 0
DIARRHEA: 0
ABDOMINAL PAIN: 0
NAUSEA: 0
SHORTNESS OF BREATH: 0
COUGH: 0
EYES NEGATIVE: 1

## 2018-09-26 ASSESSMENT — PATIENT HEALTH QUESTIONNAIRE - PHQ9
1. LITTLE INTEREST OR PLEASURE IN DOING THINGS: 0
SUM OF ALL RESPONSES TO PHQ9 QUESTIONS 1 & 2: 0
SUM OF ALL RESPONSES TO PHQ QUESTIONS 1-9: 0
2. FEELING DOWN, DEPRESSED OR HOPELESS: 0
SUM OF ALL RESPONSES TO PHQ QUESTIONS 1-9: 0

## 2018-09-26 NOTE — PROGRESS NOTES
 Coenzyme Q10 100 MG TABS Take 400 mg by mouth daily      Turmeric 500 MG CAPS Take 500 mg by mouth daily      Multiple Vitamins-Minerals (THERAPEUTIC MULTIVITAMIN-MINERALS) tablet Take 1 tablet by mouth daily      metoprolol tartrate (LOPRESSOR) 50 MG tablet Take 1 tablet by mouth 2 times daily 180 tablet 3    donepezil (ARICEPT) 10 MG tablet Take 1 tablet by mouth nightly 30 tablet 3    pravastatin (PRAVACHOL) 20 MG tablet TAKE 1 TABLET DAILY 90 tablet 2    Apoaequorin (PREVAGEN) 10 MG CAPS Take 10 mg by mouth daily 30 capsule 3    Magnesium 500 MG TABS Take by mouth 2 times daily      aspirin 81 MG tablet Take 81 mg by mouth 2 times daily.  KLOR-CON M20 20 MEQ tablet TAKE 1 TABLET DAILY 90 tablet 2    Plant Sterols and Stanols (CHOLEST OFF PO) Take 900 mg by mouth 2 times daily.  Cholecalciferol (VITAMIN D-3) 5000 UNITS TABS Take  by mouth.  glucose blood VI test strips ([employer] CONTOUR TEST) strip by In Vitro route as needed. Test bid       MICROLET LANCETS MISC by Does not apply route. bid         fish oil-omega-3 fatty acids 1000 MG capsule Take 1 g by mouth every other day. No current facility-administered medications for this visit. PMH, Surgical Hx, Family Hx, and Social Hx reviewed and updated. Health Maintenance reviewed. Objective    Vitals:    09/26/18 1404   BP: 134/76   Pulse: 54   Resp: 14   Temp: 98.2 °F (36.8 °C)   TempSrc: Temporal   Weight: 247 lb (112 kg)   Height: 6' 1\" (1.854 m)       Physical Exam   Constitutional: He is oriented to person, place, and time. He appears well-developed and well-nourished. HENT:   Head: Normocephalic and atraumatic. Right Ear: External ear normal.   Left Ear: External ear normal.   Mouth/Throat: Oropharynx is clear and moist.   Eyes: Pupils are equal, round, and reactive to light. Conjunctivae and EOM are normal.   Neck: Normal range of motion. Neck supple. No JVD present. No thyromegaly present.    Cardiovascular: for Advice and Opinion     Requested Specialty:   Urology     Number of Visits Requested:   1    POCT Urinalysis No Micro (Auto)     Orders Placed This Encounter   Medications    donepezil (ARICEPT) 10 MG tablet     Sig: Take 2 tablets every night     Dispense:  60 tablet     Refill:  3     Medications Discontinued During This Encounter   Medication Reason    donepezil (ARICEPT) 10 MG tablet      No Follow-up on file. Controlled Substances Monitoring:     No flowsheet data found.     Alissa Granados MD

## 2018-09-27 ENCOUNTER — TELEPHONE (OUTPATIENT)
Dept: FAMILY MEDICINE CLINIC | Age: 73
End: 2018-09-27

## 2018-09-27 NOTE — TELEPHONE ENCOUNTER
Pt was seen yesterday. Pt was given referral for outside GI Donpritesh Kinsey was the name he was given). Pt wants to use [facility]. Pt stated he called to set up appt with Dr. Lester Linda and can not be seen for 7 weeks. Pt states this is unacceptable and wants a new recommendation for a [facility], [facility] physician that he can be seen much sooner with but does not want the \"last man on the totem pole\". Pt is fine with us making the appt. No restrictions on time/day.   Please advise

## 2018-10-17 ENCOUNTER — OFFICE VISIT (OUTPATIENT)
Dept: FAMILY MEDICINE CLINIC | Age: 73
End: 2018-10-17
Payer: MEDICARE

## 2018-10-17 VITALS
TEMPERATURE: 96.6 F | BODY MASS INDEX: 33 KG/M2 | HEIGHT: 73 IN | SYSTOLIC BLOOD PRESSURE: 138 MMHG | WEIGHT: 249 LBS | RESPIRATION RATE: 20 BRPM | HEART RATE: 76 BPM | DIASTOLIC BLOOD PRESSURE: 78 MMHG

## 2018-10-17 DIAGNOSIS — F03.90 DEMENTIA WITHOUT BEHAVIORAL DISTURBANCE, UNSPECIFIED DEMENTIA TYPE: ICD-10-CM

## 2018-10-17 DIAGNOSIS — K57.92 DIVERTICULITIS: Primary | ICD-10-CM

## 2018-10-17 DIAGNOSIS — I42.9 CARDIOMYOPATHY, UNSPECIFIED TYPE (HCC): ICD-10-CM

## 2018-10-17 DIAGNOSIS — Z09 HOSPITAL DISCHARGE FOLLOW-UP: ICD-10-CM

## 2018-10-17 PROCEDURE — 99214 OFFICE O/P EST MOD 30 MIN: CPT | Performed by: FAMILY MEDICINE

## 2018-10-17 ASSESSMENT — ENCOUNTER SYMPTOMS
SINUS PRESSURE: 0
EYE ITCHING: 0
CONSTIPATION: 0
DIARRHEA: 0
SORE THROAT: 0
ABDOMINAL PAIN: 1
COUGH: 0
EYE DISCHARGE: 0
SHORTNESS OF BREATH: 0

## 2018-10-17 NOTE — PROGRESS NOTES
0.52 g by mouth daily      Coenzyme Q10 100 MG TABS Take 400 mg by mouth daily      Turmeric 500 MG CAPS Take 500 mg by mouth daily      Multiple Vitamins-Minerals (THERAPEUTIC MULTIVITAMIN-MINERALS) tablet Take 1 tablet by mouth daily      metoprolol tartrate (LOPRESSOR) 50 MG tablet Take 1 tablet by mouth 2 times daily 180 tablet 3    pravastatin (PRAVACHOL) 20 MG tablet TAKE 1 TABLET DAILY 90 tablet 2    Apoaequorin (PREVAGEN) 10 MG CAPS Take 10 mg by mouth daily 30 capsule 3    Magnesium 500 MG TABS Take by mouth 2 times daily      aspirin 81 MG tablet Take 81 mg by mouth 2 times daily.  KLOR-CON M20 20 MEQ tablet TAKE 1 TABLET DAILY 90 tablet 2    Plant Sterols and Stanols (CHOLEST OFF PO) Take 900 mg by mouth 2 times daily.  Cholecalciferol (VITAMIN D-3) 5000 UNITS TABS Take  by mouth.  glucose blood VI test strips ([employer] CONTOUR TEST) strip by In Vitro route as needed. Test bid       MICROLET LANCETS MISC by Does not apply route. bid         fish oil-omega-3 fatty acids 1000 MG capsule Take 1 g by mouth every other day. No current facility-administered medications for this visit. PMH, Surgical Hx, Family Hx, and Social Hx reviewed and updated. Health Maintenance reviewed. Objective    There were no vitals filed for this visit. Physical Exam   Constitutional: He is oriented to person, place, and time. He appears well-developed and well-nourished. HENT:   Head: Normocephalic and atraumatic. Right Ear: External ear normal.   Left Ear: External ear normal.   Mouth/Throat: Oropharynx is clear and moist.   Eyes: Pupils are equal, round, and reactive to light. Conjunctivae and EOM are normal.   Neck: Normal range of motion. Neck supple. No JVD present. No thyromegaly present. Cardiovascular: Normal rate, regular rhythm, normal heart sounds and intact distal pulses. Exam reveals no gallop and no friction rub. No murmur heard.   Pulmonary/Chest: Effort normal and

## 2018-10-26 ENCOUNTER — OFFICE VISIT (OUTPATIENT)
Dept: FAMILY MEDICINE CLINIC | Age: 73
End: 2018-10-26
Payer: MEDICARE

## 2018-10-26 VITALS
HEART RATE: 72 BPM | HEIGHT: 73 IN | TEMPERATURE: 96.5 F | DIASTOLIC BLOOD PRESSURE: 72 MMHG | BODY MASS INDEX: 30.91 KG/M2 | OXYGEN SATURATION: 95 % | RESPIRATION RATE: 16 BRPM | WEIGHT: 233.2 LBS | SYSTOLIC BLOOD PRESSURE: 118 MMHG

## 2018-10-26 DIAGNOSIS — R19.7 DIARRHEA, UNSPECIFIED TYPE: Primary | ICD-10-CM

## 2018-10-26 DIAGNOSIS — E86.0 DEHYDRATION: ICD-10-CM

## 2018-10-26 DIAGNOSIS — I10 ESSENTIAL HYPERTENSION: ICD-10-CM

## 2018-10-26 PROCEDURE — 99214 OFFICE O/P EST MOD 30 MIN: CPT | Performed by: FAMILY MEDICINE

## 2018-10-26 RX ORDER — DICYCLOMINE HYDROCHLORIDE 10 MG/1
CAPSULE ORAL
Refills: 6 | COMMUNITY
Start: 2018-10-23

## 2018-10-26 RX ORDER — CIPROFLOXACIN 500 MG/1
TABLET, FILM COATED ORAL
Refills: 0 | COMMUNITY
Start: 2018-10-17 | End: 2019-03-18 | Stop reason: ALTCHOICE

## 2018-10-26 NOTE — PROGRESS NOTES
bypass surgery 8/10/2012    Hyperlipidemia     Hyperlipidemia 1/6/2017    Hypertension     Hypogonadism     MI, old     discovered MI in 2012, occured prior to 2007    Obesity      Past Surgical History:   Procedure Laterality Date    APPENDECTOMY      BACK SURGERY  05/15/14    Spinal stenosis, laminectomy partial L1 complete L2 L3 L4 partial L5    CARDIAC CATHETERIZATION  11/06/2002    LEFT    CARDIAC SURGERY  07-19-12    [facility], bypass x 5 Dr. Hernan Tracy  9/18/2013    CATARACT REMOVAL WITH IMPLANT  11/2009    RIGHT    CATARACT REMOVAL WITH IMPLANT  02/2017    left eye    COLONOSCOPY  1997    COLONOSCOPY  06/04/15     [address]    5 YRS    CRUCIATE LIGAMENT REPAIR  09/14/15    DR Mirella Irene    KNEE SURGERY  09/2015    left knee partial knee replacement    OTHER SURGICAL HISTORY  11/8/2013    EPIDURAL STEROID INJ./LUMBAR     OTHER SURGICAL HISTORY      hematoma evacuation right buttocks    CO COLONOSCOPY FLX DX W/COLLJ SPEC WHEN PFRMD N/A 7/31/2018    COLONOSCOPY performed by Kelly Vines MD at [address]      prostate removal     PROSTATECTOMY  02/01/16    DR Sergei Salinas RETINAL DETACHMENT SURGERY      right eye     SHOULDER SURGERY      [facility]         REVIEW OF SYSTEMS:   Patient seen today for exam.  Denies any problems with hearing, headaches or vision. Denies any shortness of breath, chest pain, nausea orvomiting. No black stool, no blood in the stool. No heartburn. Denies any problems with constipation or diarrhea either. No dysuria type symptoms. Objective:     /72   Pulse 72   Temp 96.5 °F (35.8 °C) (Temporal)   Resp 16   Ht 6' 1\" (1.854 m)   Wt 233 lb 3.2 oz (105.8 kg)   SpO2 95%   BMI 30.77 kg/m²     Physical Exam        O:  Alert and active male in no acute distress  HEENT:  TMs clear. Pharynx neg.  Nares clear, no drainage noted  Neck supple/ no adenopathy   HEART:  RRR without murmur/ no carotid

## 2018-11-12 ENCOUNTER — OFFICE VISIT (OUTPATIENT)
Dept: FAMILY MEDICINE CLINIC | Age: 73
End: 2018-11-12
Payer: MEDICARE

## 2018-11-12 VITALS
SYSTOLIC BLOOD PRESSURE: 148 MMHG | BODY MASS INDEX: 31.94 KG/M2 | HEART RATE: 98 BPM | RESPIRATION RATE: 20 BRPM | TEMPERATURE: 97.5 F | HEIGHT: 73 IN | DIASTOLIC BLOOD PRESSURE: 94 MMHG | WEIGHT: 241 LBS | OXYGEN SATURATION: 98 %

## 2018-11-12 DIAGNOSIS — J20.9 ACUTE BRONCHITIS, UNSPECIFIED ORGANISM: ICD-10-CM

## 2018-11-12 DIAGNOSIS — R06.2 WHEEZING: ICD-10-CM

## 2018-11-12 DIAGNOSIS — R05.9 COUGH: Primary | ICD-10-CM

## 2018-11-12 LAB
INFLUENZA A ANTIBODY: NEGATIVE
INFLUENZA B ANTIBODY: NEGATIVE

## 2018-11-12 PROCEDURE — 99214 OFFICE O/P EST MOD 30 MIN: CPT | Performed by: FAMILY MEDICINE

## 2018-11-12 PROCEDURE — 87804 INFLUENZA ASSAY W/OPTIC: CPT | Performed by: FAMILY MEDICINE

## 2018-11-12 PROCEDURE — 96372 THER/PROPH/DIAG INJ SC/IM: CPT | Performed by: FAMILY MEDICINE

## 2018-11-12 RX ORDER — CEFTRIAXONE 1 G/1
1 INJECTION, POWDER, FOR SOLUTION INTRAMUSCULAR; INTRAVENOUS ONCE
Status: COMPLETED | OUTPATIENT
Start: 2018-11-12 | End: 2018-11-12

## 2018-11-12 RX ORDER — CEFDINIR 300 MG/1
300 CAPSULE ORAL 2 TIMES DAILY
Qty: 20 CAPSULE | Refills: 0 | Status: SHIPPED | OUTPATIENT
Start: 2018-11-12 | End: 2019-03-18 | Stop reason: ALTCHOICE

## 2018-11-12 RX ORDER — METHYLPREDNISOLONE 4 MG/1
TABLET ORAL
Qty: 1 KIT | Refills: 0 | Status: SHIPPED | OUTPATIENT
Start: 2018-11-12 | End: 2019-03-18 | Stop reason: ALTCHOICE

## 2018-11-12 RX ADMIN — CEFTRIAXONE 1 G: 1 INJECTION, POWDER, FOR SOLUTION INTRAMUSCULAR; INTRAVENOUS at 16:27

## 2018-11-12 ASSESSMENT — ENCOUNTER SYMPTOMS
SORE THROAT: 1
SINUS PAIN: 1
CONSTIPATION: 0
SORE THROAT: 0
SHORTNESS OF BREATH: 0
COUGH: 1
EYE DISCHARGE: 0
EYE ITCHING: 0
SINUS PRESSURE: 1
DIARRHEA: 0
ABDOMINAL PAIN: 0
RHINORRHEA: 1

## 2018-11-12 NOTE — PROGRESS NOTES
RIGHT    CATARACT REMOVAL WITH IMPLANT  02/2017    left eye    COLONOSCOPY  1997    COLONOSCOPY  06/04/15    DR Aleja Coles    5 YRS    CRUCIATE LIGAMENT REPAIR  09/14/15    DR Coral Foster    KNEE SURGERY  09/2015    left knee partial knee replacement    OTHER SURGICAL HISTORY  11/8/2013    EPIDURAL STEROID INJ./LUMBAR     OTHER SURGICAL HISTORY      hematoma evacuation right buttocks    IN COLONOSCOPY FLX DX W/COLLJ SPEC WHEN PFRMD N/A 7/31/2018    COLONOSCOPY performed by Donny Sahni MD at [address]      prostate removal     PROSTATECTOMY  02/01/16    DR Chiki Okeefe 12 DETACHMENT SURGERY      right eye     SHOULDER SURGERY      [facility]     Social History     Social History    Marital status:      Spouse name: Margeret City     Number of children: N/A    Years of education: N/A     Occupational History     Retired     Social History Main Topics    Smoking status: Never Smoker    Smokeless tobacco: Never Used    Alcohol use 8.4 oz/week     14 Shots of liquor per week    Drug use: No    Sexual activity: Not on file     Other Topics Concern    Not on file     Social History Narrative    No narrative on file     Family History   Problem Relation Age of Onset    Heart Disease Father         MI     Allergies   Allergen Reactions    Shellfish Allergy      Other reaction(s): Vomiting    Statins Other (See Comments)     Lipitor 40 mg caused severe muscle pains , weakness occurred after 5 years of taking the medication and became significantly worse. Pain corrected immediately after stopping Lipitor.      Sulfa Antibiotics Rash     Skin irritation     Current Outpatient Prescriptions   Medication Sig Dispense Refill    dicyclomine (BENTYL) 10 MG capsule TAKE ONE CAPSULE BY MOUTH THREE TIMES A DAY BEFORE MEALS  6    ciprofloxacin (CIPRO) 500 MG tablet TAKE ONE TABLET BY MOUTH TWICE A DAY FOR 10 DAYS  0    donepezil (ARICEPT) 10 MG tablet Take 2
place, and time. He appears well-developed and well-nourished. HENT:   Head: Normocephalic and atraumatic. Right Ear: External ear normal.   Left Ear: External ear normal.   Mouth/Throat: Oropharynx is clear and moist.   Eyes: Pupils are equal, round, and reactive to light. Conjunctivae and EOM are normal.   Neck: Normal range of motion. Neck supple. No JVD present. No thyromegaly present. Cardiovascular: Normal rate, regular rhythm, normal heart sounds and intact distal pulses. Exam reveals no gallop and no friction rub. No murmur heard. Pulmonary/Chest: Effort normal and breath sounds normal. No respiratory distress. Abdominal: Bowel sounds are normal. He exhibits no mass. There is no tenderness. Musculoskeletal: Normal range of motion. Neurological: He is alert and oriented to person, place, and time. Skin: Skin is warm and dry. Psychiatric: He has a normal mood and affect. His behavior is normal.     Flu swab shows negative findings with influenza. He has some mild pharyngitis reveals postnasal drainage she has some scattered rhonchi in the upper lobes bilaterally no wheezes crackles or retractions. He states he was wheezing last night when lying down. No chest pain or shortness of breath pulse ox normal at rest.  No abdominal discomfort no rebound no guarding since bronchitis pharyngitis plan grammar Rocephin followed up with Omnicef in the morning Medrol Dosepak and Hycodan for cough not improving or new symptoms develop he is to let us know or go to the emergency room. He is a follow-up later this  Assessment & Plan    Diagnosis Orders   1. Cough  XR CHEST STANDARD (2 VW)    POCT Influenza A/B    HYDROcodone-homatropine (HYCODAN) 5-1.5 MG/5ML syrup   2. Wheezing  XR CHEST STANDARD (2 VW)    POCT Influenza A/B    HYDROcodone-homatropine (HYCODAN) 5-1.5 MG/5ML syrup   3.  Acute bronchitis, unspecified organism  HYDROcodone-homatropine (HYCODAN) 5-1.5 MG/5ML syrup     Orders Placed This

## 2018-11-13 ENCOUNTER — TELEPHONE (OUTPATIENT)
Dept: FAMILY MEDICINE CLINIC | Age: 73
End: 2018-11-13

## 2018-11-26 ENCOUNTER — TELEPHONE (OUTPATIENT)
Dept: PHARMACY | Facility: CLINIC | Age: 73
End: 2018-11-26

## 2019-02-18 RX ORDER — PRAVASTATIN SODIUM 20 MG
TABLET ORAL
Qty: 90 TABLET | Refills: 2 | Status: SHIPPED | OUTPATIENT
Start: 2019-02-18 | End: 2019-05-07

## 2019-03-05 ENCOUNTER — TELEPHONE (OUTPATIENT)
Dept: ADMINISTRATIVE | Age: 74
End: 2019-03-05

## 2019-03-05 ENCOUNTER — TELEPHONE (OUTPATIENT)
Dept: FAMILY MEDICINE CLINIC | Age: 74
End: 2019-03-05

## 2019-03-18 ENCOUNTER — OFFICE VISIT (OUTPATIENT)
Dept: FAMILY MEDICINE CLINIC | Age: 74
End: 2019-03-18
Payer: MEDICARE

## 2019-03-18 VITALS
WEIGHT: 251 LBS | DIASTOLIC BLOOD PRESSURE: 62 MMHG | RESPIRATION RATE: 20 BRPM | BODY MASS INDEX: 33.27 KG/M2 | HEIGHT: 73 IN | SYSTOLIC BLOOD PRESSURE: 118 MMHG | HEART RATE: 52 BPM | TEMPERATURE: 97.7 F

## 2019-03-18 DIAGNOSIS — E78.5 HYPERLIPIDEMIA, UNSPECIFIED HYPERLIPIDEMIA TYPE: ICD-10-CM

## 2019-03-18 DIAGNOSIS — E11.9 TYPE 2 DIABETES MELLITUS WITHOUT COMPLICATION, WITHOUT LONG-TERM CURRENT USE OF INSULIN (HCC): ICD-10-CM

## 2019-03-18 DIAGNOSIS — I10 ESSENTIAL HYPERTENSION: Primary | ICD-10-CM

## 2019-03-18 DIAGNOSIS — I42.9 CARDIOMYOPATHY, UNSPECIFIED TYPE (HCC): ICD-10-CM

## 2019-03-18 DIAGNOSIS — F03.90 DEMENTIA WITHOUT BEHAVIORAL DISTURBANCE, UNSPECIFIED DEMENTIA TYPE: ICD-10-CM

## 2019-03-18 PROCEDURE — G8510 SCR DEP NEG, NO PLAN REQD: HCPCS | Performed by: FAMILY MEDICINE

## 2019-03-18 PROCEDURE — 99214 OFFICE O/P EST MOD 30 MIN: CPT | Performed by: FAMILY MEDICINE

## 2019-03-18 RX ORDER — AMLODIPINE BESYLATE AND BENAZEPRIL HYDROCHLORIDE 5; 20 MG/1; MG/1
1 CAPSULE ORAL DAILY
COMMUNITY
Start: 2019-01-03

## 2019-03-18 RX ORDER — AMLODIPINE BESYLATE AND BENAZEPRIL HYDROCHLORIDE 5; 20 MG/1; MG/1
1 CAPSULE ORAL DAILY
Qty: 30 CAPSULE | Refills: 5 | Status: CANCELLED | OUTPATIENT
Start: 2019-03-18

## 2019-03-18 RX ORDER — GABAPENTIN 300 MG/1
300 CAPSULE ORAL NIGHTLY
Qty: 30 CAPSULE | Refills: 0 | Status: SHIPPED | OUTPATIENT
Start: 2019-03-18 | End: 2019-04-17

## 2019-03-18 RX ORDER — DONEPEZIL HYDROCHLORIDE 10 MG/1
TABLET, FILM COATED ORAL
Qty: 60 TABLET | Refills: 5 | Status: SHIPPED | OUTPATIENT
Start: 2019-03-18

## 2019-03-18 ASSESSMENT — PATIENT HEALTH QUESTIONNAIRE - PHQ9
1. LITTLE INTEREST OR PLEASURE IN DOING THINGS: 0
SUM OF ALL RESPONSES TO PHQ QUESTIONS 1-9: 0
2. FEELING DOWN, DEPRESSED OR HOPELESS: 0
SUM OF ALL RESPONSES TO PHQ9 QUESTIONS 1 & 2: 0
SUM OF ALL RESPONSES TO PHQ QUESTIONS 1-9: 0

## 2019-03-18 ASSESSMENT — ENCOUNTER SYMPTOMS
EYE ITCHING: 0
ABDOMINAL PAIN: 0
DIARRHEA: 0
SORE THROAT: 0
EYE DISCHARGE: 0
CONSTIPATION: 0
SHORTNESS OF BREATH: 0
COUGH: 0
SINUS PRESSURE: 0

## 2019-03-25 ENCOUNTER — TELEPHONE (OUTPATIENT)
Dept: FAMILY MEDICINE CLINIC | Age: 74
End: 2019-03-25

## 2019-03-27 ENCOUNTER — TELEPHONE (OUTPATIENT)
Dept: FAMILY MEDICINE CLINIC | Age: 74
End: 2019-03-27

## 2019-05-07 ENCOUNTER — TELEPHONE (OUTPATIENT)
Dept: FAMILY MEDICINE CLINIC | Age: 74
End: 2019-05-07

## 2019-05-07 RX ORDER — PRAVASTATIN SODIUM 20 MG
40 TABLET ORAL DAILY
Qty: 180 TABLET | Refills: 3 | Status: SHIPPED | OUTPATIENT
Start: 2019-05-07

## 2019-05-07 NOTE — TELEPHONE ENCOUNTER
Patients medication has been filled for now patient states staying with previous doctor, Dr Edison Lund no more refills to be sent to Dr Gail Arshad patient must request from his

## 2019-08-21 ENCOUNTER — TELEPHONE (OUTPATIENT)
Dept: ADMINISTRATIVE | Age: 74
End: 2019-08-21

## 2019-08-21 NOTE — TELEPHONE ENCOUNTER
Spoke with patient. The Pt called to share that he will be following  Dr Kanika Dahl to [facility].

## 2022-07-20 RX ORDER — POLYETHYLENE GLYCOL 3350, SODIUM CHLORIDE, SODIUM BICARBONATE, POTASSIUM CHLORIDE 420; 11.2; 5.72; 1.48 G/4L; G/4L; G/4L; G/4L
4000 POWDER, FOR SOLUTION ORAL ONCE
Qty: 4000 ML | Refills: 0 | Status: SHIPPED | OUTPATIENT
Start: 2022-07-20 | End: 2022-07-20

## 2022-08-08 ENCOUNTER — ANESTHESIA (OUTPATIENT)
Dept: ENDOSCOPY | Age: 77
End: 2022-08-08
Payer: MEDICARE

## 2022-08-08 ENCOUNTER — ANESTHESIA EVENT (OUTPATIENT)
Dept: ENDOSCOPY | Age: 77
End: 2022-08-08
Payer: MEDICARE

## 2022-08-08 ENCOUNTER — HOSPITAL ENCOUNTER (OUTPATIENT)
Age: 77
Setting detail: OUTPATIENT SURGERY
Discharge: HOME | End: 2022-08-08
Attending: SPECIALIST | Admitting: SPECIALIST
Payer: MEDICARE

## 2022-08-08 VITALS
HEART RATE: 57 BPM | SYSTOLIC BLOOD PRESSURE: 152 MMHG | OXYGEN SATURATION: 99 % | TEMPERATURE: 97.3 F | BODY MASS INDEX: 31.15 KG/M2 | HEIGHT: 72 IN | DIASTOLIC BLOOD PRESSURE: 71 MMHG | RESPIRATION RATE: 18 BRPM | WEIGHT: 230 LBS

## 2022-08-08 DIAGNOSIS — Z86.010 PERSONAL HISTORY OF COLONIC POLYPS: ICD-10-CM

## 2022-08-08 PROBLEM — D12.6 ADENOMATOUS POLYP OF COLON: Status: ACTIVE | Noted: 2022-08-08

## 2022-08-08 PROCEDURE — 2580000003 HC RX 258

## 2022-08-08 PROCEDURE — 7100000010 HC PHASE II RECOVERY - FIRST 15 MIN: Performed by: SPECIALIST

## 2022-08-08 PROCEDURE — 2580000003 HC RX 258: Performed by: SPECIALIST

## 2022-08-08 PROCEDURE — 3609027000 HC COLONOSCOPY: Performed by: SPECIALIST

## 2022-08-08 PROCEDURE — 6360000002 HC RX W HCPCS: Performed by: NURSE ANESTHETIST, CERTIFIED REGISTERED

## 2022-08-08 PROCEDURE — 3700000000 HC ANESTHESIA ATTENDED CARE: Performed by: SPECIALIST

## 2022-08-08 PROCEDURE — 45385 COLONOSCOPY W/LESION REMOVAL: CPT | Performed by: SPECIALIST

## 2022-08-08 PROCEDURE — 2709999900 HC NON-CHARGEABLE SUPPLY: Performed by: SPECIALIST

## 2022-08-08 PROCEDURE — 88305 TISSUE EXAM BY PATHOLOGIST: CPT

## 2022-08-08 PROCEDURE — C1713 ANCHOR/SCREW BN/BN,TIS/BN: HCPCS | Performed by: SPECIALIST

## 2022-08-08 PROCEDURE — 45390 COLONOSCOPY W/RESECTION: CPT | Performed by: SPECIALIST

## 2022-08-08 PROCEDURE — 6370000000 HC RX 637 (ALT 250 FOR IP): Performed by: SPECIALIST

## 2022-08-08 PROCEDURE — 2500000003 HC RX 250 WO HCPCS: Performed by: NURSE ANESTHETIST, CERTIFIED REGISTERED

## 2022-08-08 PROCEDURE — 7100000011 HC PHASE II RECOVERY - ADDTL 15 MIN: Performed by: SPECIALIST

## 2022-08-08 PROCEDURE — 3700000001 HC ADD 15 MINUTES (ANESTHESIA): Performed by: SPECIALIST

## 2022-08-08 RX ORDER — POTASSIUM CHLORIDE 1.5 G/1.77G
20 POWDER, FOR SOLUTION ORAL DAILY
COMMUNITY

## 2022-08-08 RX ORDER — PROPOFOL 10 MG/ML
INJECTION, EMULSION INTRAVENOUS PRN
Status: DISCONTINUED | OUTPATIENT
Start: 2022-08-08 | End: 2022-08-08 | Stop reason: SDUPTHER

## 2022-08-08 RX ORDER — SODIUM CHLORIDE 9 MG/ML
INJECTION, SOLUTION INTRAVENOUS
Status: COMPLETED
Start: 2022-08-08 | End: 2022-08-08

## 2022-08-08 RX ORDER — SIMETHICONE 20 MG/.3ML
EMULSION ORAL PRN
Status: DISCONTINUED | OUTPATIENT
Start: 2022-08-08 | End: 2022-08-08 | Stop reason: ALTCHOICE

## 2022-08-08 RX ORDER — ACETAMINOPHEN 500 MG
500 TABLET ORAL 2 TIMES DAILY
COMMUNITY

## 2022-08-08 RX ORDER — SODIUM CHLORIDE 9 MG/ML
INJECTION, SOLUTION INTRAVENOUS CONTINUOUS
Status: DISCONTINUED | OUTPATIENT
Start: 2022-08-08 | End: 2022-08-08 | Stop reason: HOSPADM

## 2022-08-08 RX ORDER — LIDOCAINE HYDROCHLORIDE 20 MG/ML
INJECTION, SOLUTION INFILTRATION; PERINEURAL PRN
Status: DISCONTINUED | OUTPATIENT
Start: 2022-08-08 | End: 2022-08-08 | Stop reason: SDUPTHER

## 2022-08-08 RX ORDER — MAGNESIUM HYDROXIDE 1200 MG/15ML
LIQUID ORAL PRN
Status: DISCONTINUED | OUTPATIENT
Start: 2022-08-08 | End: 2022-08-08 | Stop reason: ALTCHOICE

## 2022-08-08 RX ADMIN — SODIUM CHLORIDE: 9 INJECTION, SOLUTION INTRAVENOUS at 10:34

## 2022-08-08 RX ADMIN — SODIUM CHLORIDE 500 ML: 9 INJECTION, SOLUTION INTRAVENOUS at 09:21

## 2022-08-08 RX ADMIN — LIDOCAINE HYDROCHLORIDE 50 MG: 20 INJECTION, SOLUTION INFILTRATION; PERINEURAL at 10:35

## 2022-08-08 RX ADMIN — PROPOFOL 50 MG: 10 INJECTION, EMULSION INTRAVENOUS at 10:42

## 2022-08-08 RX ADMIN — PROPOFOL 50 MG: 10 INJECTION, EMULSION INTRAVENOUS at 10:47

## 2022-08-08 RX ADMIN — PROPOFOL 50 MG: 10 INJECTION, EMULSION INTRAVENOUS at 10:53

## 2022-08-08 RX ADMIN — PROPOFOL 100 MG: 10 INJECTION, EMULSION INTRAVENOUS at 10:35

## 2022-08-08 RX ADMIN — PROPOFOL 50 MG: 10 INJECTION, EMULSION INTRAVENOUS at 10:58

## 2022-08-08 ASSESSMENT — PAIN - FUNCTIONAL ASSESSMENT
PAIN_FUNCTIONAL_ASSESSMENT: NONE - DENIES PAIN
PAIN_FUNCTIONAL_ASSESSMENT: NONE - DENIES PAIN

## 2022-08-08 NOTE — ANESTHESIA POSTPROCEDURE EVALUATION
Department of Anesthesiology  Postprocedure Note    Patient: Ninfa Scanlon  MRN: [id number]  YOB: 1945  Date of evaluation: 8/8/2022      Procedure Summary     Date: 08/08/22 Room / Location: [address] [place] [address]    Anesthesia Start: 1031 Anesthesia Stop:     Procedure: [address], HIGH RISK Diagnosis:       Personal history of colonic polyps      (Screening)    Surgeons: Colleen Burns MD Responsible Provider: ANUJA Jerry CRNA    Anesthesia Type: MAC ASA Status: 4          Anesthesia Type: No value filed.     Magnus Phase I: Magnus Score: 10    Magnus Phase II:        Anesthesia Post Evaluation    Patient location during evaluation: PACU  Patient participation: complete - patient participated  Level of consciousness: awake  Pain score: 0  Airway patency: patent  Nausea & Vomiting: no nausea  Complications: no  Cardiovascular status: hemodynamically stable  Respiratory status: acceptable  Hydration status: stable

## 2022-08-08 NOTE — ANESTHESIA PRE PROCEDURE
Department of Anesthesiology  Preprocedure Note       Name:  Rachel Hale   Age:  68 y.o.  :  1945                                          MRN:  [id number]         Date:  2022      Surgeon: Remi Henry):  Oneida Santillan MD    Procedure: Procedure(s):  COLORECTAL CANCER SCREENING, HIGH RISK    Medications prior to admission:   Prior to Admission medications    Medication Sig Start Date End Date Taking? Authorizing Provider   pravastatin (PRAVACHOL) 20 MG tablet Take 2 tablets by mouth daily 19   Brayan Edmonds MD   amLODIPine-benazepril (LOTREL) 5-20 MG per capsule Take 1 capsule by mouth daily 1/3/19   Historical Provider, MD   donepezil (ARICEPT) 10 MG tablet Take 2 tablets every night 3/18/19   Adair Worley MD   gabapentin (NEURONTIN) 300 MG capsule Take 1 capsule by mouth nightly for 30 days. Intended supply: 30 days 3/18/19 4/17/19  Adair Worley MD   dicyclomine (BENTYL) 10 MG capsule TAKE ONE CAPSULE BY MOUTH THREE TIMES A DAY BEFORE MEALS 10/23/18   Historical Provider, MD   Probiotic Product (PROBIOTIC-10 PO) Take 1 tablet by mouth daily    Historical Provider, MD   psyllium 0.52 g capsule Take 0.52 g by mouth daily    Historical Provider, MD   Coenzyme Q10 100 MG TABS Take 400 mg by mouth daily    Historical Provider, MD   Turmeric 500 MG CAPS Take 500 mg by mouth daily    Historical Provider, MD   Multiple Vitamins-Minerals (THERAPEUTIC MULTIVITAMIN-MINERALS) tablet Take 1 tablet by mouth daily    Historical Provider, MD   metoprolol tartrate (LOPRESSOR) 50 MG tablet Take 1 tablet by mouth 2 times daily 18   Adair Worley MD   Apoaequorin (PREVAGEN) 10 MG CAPS Take 10 mg by mouth daily 16   Adair Worley MD   aspirin 81 MG tablet Take 81 mg by mouth 2 times daily. Historical Provider, MD   Cholecalciferol (VITAMIN D-3) 5000 UNITS TABS Take  by mouth. Historical Provider, MD   glucose blood VI test strips (EDDIE CONTOUR TEST) strip by In Vitro route as needed. Test bid     Historical Provider, MD   MICROLET LANCETS MISC by Does not apply route. bid       Historical Provider, MD   fish oil-omega-3 fatty acids 1000 MG capsule Take 1 g by mouth every other day. Historical Provider, MD       Current medications:    Current Facility-Administered Medications   Medication Dose Route Frequency Provider Last Rate Last Admin    sterile water for irrigation    PRN Jeanine Varela MD   500 mL at 08/08/22 0844    simethicone (MYLICON) 40 OV/3.8LJ drops    PRN Jeanine Varela MD   40 mg at 08/08/22 0844    sodium chloride 0.9 % infusion             0.9 % sodium chloride infusion   IntraVENous Continuous Jeanine Varela MD           Allergies: Allergies   Allergen Reactions    Shellfish Allergy      Other reaction(s): Vomiting    Statins Other (See Comments)     Lipitor 40 mg caused severe muscle pains , weakness occurred after 5 years of taking the medication and became significantly worse. Pain corrected immediately after stopping Lipitor.     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    History of heart bypass surgery Z95.1    Cervical spinal stenosis 3,4, and 5 M48.02    Pulmonary collapse J98.19    Hypertrophy of prostate without urinary obstruction and other lower urinary tract symptoms (LUTS) N40.0    Coronary atherosclerosis I25.10    Other specified counseling Z71.89    Lumbago M54.50    Obesity E66.9    Obstructive sleep apnea G47.33    Osteoarthritis M19.90    Rotator cuff (capsule) sprain S43.429A    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    Other drug allergy(995.27) Z88.8    Vitamin D deficiency E55.9    Benign prostatic hyperplasia N40.0    Hyperlipidemia E78.5    Dementia (HCC) F03.90       Past Medical History:        Diagnosis Date    Anxiety     BPH (benign prostatic hypertrophy)     BPH (benign prostatic hypertrophy) 4/27/2015    CAD (coronary artery disease)     Cervical spinal stenosis 3,4, and 5 6/21/2013    Dementia ([address] 10/17/2018    ETOH abuse     Gout     History of heart bypass surgery 8/10/2012    Hyperlipidemia     Hyperlipidemia 1/6/2017    Hypertension     Hypogonadism     MI, old     discovered MI in 2012, occured prior to 2007    Obesity        Past Surgical History:        Procedure Laterality Date    APPENDECTOMY      BACK SURGERY  05/15/14    Spinal stenosis, laminectomy partial L1 complete L2 L3 L4 partial L5    CARDIAC CATHETERIZATION  11/06/2002    LEFT    CARDIAC SURGERY  07-19-12    [facility], bypass x 5 Dr. Hernandez Sanaz  9/18/2013    CATARACT REMOVAL WITH IMPLANT  11/2009    RIGHT    CATARACT REMOVAL WITH IMPLANT  02/2017    left eye    COLONOSCOPY  1997    COLONOSCOPY  06/04/15     [address] [place] [address]    5 YRS    CRUCIATE LIGAMENT REPAIR  09/14/15    DR Saleh Grand    KNEE SURGERY  09/2015    left knee partial knee replacement    OTHER SURGICAL HISTORY  11/8/2013    EPIDURAL STEROID INJ./LUMBAR     OTHER SURGICAL HISTORY      hematoma evacuation right buttocks    CT COLONOSCOPY FLX DX W/COLLJ SPEC WHEN PFRMD N/A 7/31/2018    COLONOSCOPY performed by Suleiman Tompkins MD at [facility] [address]      prostate removal     PROSTATECTOMY  02/01/16    DR Linda Mendez RETINAL DETACHMENT SURGERY      right eye     SHOULDER SURGERY      [facility]       Social History:    Social History     Tobacco Use    Smoking status: Never    Smokeless tobacco: Never   Substance Use Topics    Alcohol use:  Yes     Alcohol/week: 14.0 standard drinks     Types: 14 Shots of liquor per week                                Counseling given: Not COVID19        Anesthesia Evaluation    Airway: Mallampati: II          Dental:    (+) poor dentition      Pulmonary:   (+) sleep apnea:                             Cardiovascular:    (+) hypertension: no interval change, past MI: no interval change and > 6 months, CAD: no interval change, CABG/stent: no interval change,         Rhythm: regular                      Neuro/Psych:   (+) psychiatric history: stable with treatmentdementia            GI/Hepatic/Renal: Neg GI/Hepatic/Renal ROS            Endo/Other:    (+) DiabetesType II DM, no interval change, , .                 Abdominal:   (+) obese,     Abdomen: soft. Vascular: Other Findings:           Anesthesia Plan      MAC     ASA 4       Induction: intravenous. Anesthetic plan and risks discussed with patient. Plan discussed with attending.                     ANUJA Vidal - CRNA   8/8/2022

## 2022-08-08 NOTE — H&P
Intended supply: 30 days 3/18/19 4/17/19  Oretha Galeazzi, MD   dicyclomine (BENTYL) 10 MG capsule TAKE ONE CAPSULE BY MOUTH THREE TIMES A DAY BEFORE MEALS  Patient not taking: Reported on 8/8/2022 10/23/18   Historical Provider, MD   Probiotic Product (PROBIOTIC-10 PO) Take 1 tablet by mouth daily  Patient not taking: Reported on 8/8/2022    Historical Provider, MD   psyllium 0.52 g capsule Take 0.52 g by mouth daily  Patient not taking: Reported on 8/8/2022    Historical Provider, MD   Coenzyme Q10 100 MG TABS Take 400 mg by mouth daily  Patient not taking: Reported on 8/8/2022    Historical Provider, MD   Turmeric 500 MG CAPS Take 500 mg by mouth daily  Patient not taking: Reported on 8/8/2022    Historical Provider, MD   Multiple Vitamins-Minerals (THERAPEUTIC MULTIVITAMIN-MINERALS) tablet Take 1 tablet by mouth daily    Historical Provider, MD   metoprolol tartrate (LOPRESSOR) 50 MG tablet Take 1 tablet by mouth 2 times daily 7/19/18   Oretha Galeazzi, MD   Apoaequorin (PREVAGEN) 10 MG CAPS Take 10 mg by mouth daily  Patient not taking: Reported on 8/8/2022 9/27/16   Oretha Galeazzi, MD   aspirin 81 MG tablet Take 81 mg by mouth 2 times daily. Historical Provider, MD   Cholecalciferol (VITAMIN D-3) 5000 UNITS TABS Take  by mouth. Patient not taking: Reported on 8/8/2022    Historical Provider, MD   blood glucose test strips (ASCENSIA AUTODISC VI;ONE TOUCH ULTRA TEST VI) strip by In Vitro route as needed. Test bid     Historical Provider, MD   MICROLET LANCETS MISC by Does not apply route. bid       Historical Provider, MD   fish oil-omega-3 fatty acids 1000 MG capsule Take 1 g by mouth every other day. Patient not taking: Reported on 8/8/2022    Historical Provider, MD       Allergies:    Allergies   Allergen Reactions    Shellfish Allergy      Other reaction(s): Vomiting    Statins Other (See Comments)     Lipitor 40 mg caused severe muscle pains , weakness occurred after 5 years of taking the medication and became significantly worse. Pain corrected immediately after stopping Lipitor.      Sulfa Antibiotics Rash     Skin irritation        History of allergic reaction to anesthesia:  No    Past Medical History:  Past Medical History:   Diagnosis Date    Anxiety     BPH (benign prostatic hypertrophy)     BPH (benign prostatic hypertrophy) 4/27/2015    CAD (coronary artery disease)     Cervical spinal stenosis 3,4, and 5 6/21/2013    Dementia ([address] 10/17/2018    ETOH abuse     Gout     History of heart bypass surgery 8/10/2012    Hyperlipidemia     Hyperlipidemia 1/6/2017    Hypertension     Hypogonadism     MI, old     discovered MI in 2012, occured prior to 2007    Obesity        Past Surgical History:  Past Surgical History:   Procedure Laterality Date    APPENDECTOMY      BACK SURGERY  05/15/14    Spinal stenosis, laminectomy partial L1 complete L2 L3 L4 partial L5    CARDIAC CATHETERIZATION  11/06/2002    LEFT    CARDIAC SURGERY  07-19-12    [facility], bypass x 5 Dr. Sarina Bruner  9/18/2013    CATARACT REMOVAL WITH IMPLANT  11/2009    RIGHT    CATARACT REMOVAL WITH IMPLANT  02/2017    left eye    COLONOSCOPY  1997    COLONOSCOPY  06/04/15     [address]    5 YRS    CRUCIATE LIGAMENT REPAIR  09/14/15     [address] SURGERY  09/2015    left knee partial knee replacement    OTHER SURGICAL HISTORY  11/8/2013    EPIDURAL STEROID INJ./LUMBAR     OTHER SURGICAL HISTORY      hematoma evacuation right buttocks    IL COLONOSCOPY FLX DX W/COLLJ SPEC WHEN PFRMD N/A 7/31/2018    COLONOSCOPY performed by Lelo Hollingsworth MD at [address]      prostate removal     PROSTATECTOMY  02/01/16    DR Luciano Sheppard Brock      right eye     SHOULDER SURGERY      [facility]       Social History:  Social History     Tobacco Use    Smoking status: Never    Smokeless tobacco: Never   Vaping Use    Vaping Use: Never used   Substance Use Topics    Alcohol use: Yes     Alcohol/week: 14.0 standard drinks     Types: 14 Shots of liquor per week     Comment: occationally    Drug use: No       Vital Signs:   Vitals:    08/08/22 1110   BP: 116/64   Pulse: 53   Resp: 16   Temp:    SpO2: 98%        Physical Exam:  Cardiac:  [x]WNL  []Comments:  Pulmonary:  [x]WNL   []Comments:   Neuro/Mental Status:  [x]WNL  []Comments:  Abdominal:  [x]WNL    []Comments:  Other:   []WNL  []Comments:    Informed Consent:  The risks and benefits of the procedure have been discussed with either the patient or if they cannot consent, their representative. Assessment:  Patient examined and appropriate for planned sedation and procedure. Plan:  Proceed with planned sedation and procedure as above.     Suleiman Tompkins MD  11:12 AM

## 2022-08-08 NOTE — DISCHARGE INSTRUCTIONS
Lower Discharge Instructions    Patient Name: Pedrito Espinosa  Patient ID: [id number]  YOB: 1945  Procedure: Marcela Juarez  Referring Physician: [unfilled]  Procedure Date: 8/8/2022    Recommendations:  []   Follow-up appointment with family physician in 4 weeks. [x]   Colonoscopy recommended in 3 years. []   Follow-up appointment with endoscopist in  Reports of your procedure and these recommendations have been sent to:  [unfilled]    Sedative medication given for procedures can slow your reaction time and coordination for many hours. If you receive medications, it is important for your safety to follow the instructions below for the remainder of the day:  BE TAKEN directly home from the center and rest quietly. DO NOT resume normal activities until tomorrow. Do NOT drive, return to work, or operate any machinery or power tools. Do NOT make any important personal or business decisions, sign any legal papers or perform any activity that depends on your full concentration power or mental judgement. Do NOT drink any alcohol or take nerve or sleeping drugs. They add to the effects of the medicine still present in your body.    [] (Checked if a biopsy or polyp removal was performed)  There is a slight risk of bleeding. If you had a biopsy or a polyp removed, we suggest that you follow the instructions below:  Do NOT take aspirin or similar anti-inflammatory medicine for ___ days. Do NOT exercise, jog, or do any heavy lifting or straining for 1 day. Potential common after effects and treatment following the procedure:  Mild abdominal pain, bloating, or excessive gas - rest, eat lightly, and use a heating pad. Redness and/or swelling where the IV was - apply heat and elevate. Symptoms to report to your physician:  Severe abdominal pain or bloating. Chills or fever above 101 degrees occurring within 24 hours after procedure. Large amounts of rectal bleeding that does not stop.  Small amount of rectal bleeding is not serious especially if hemorrhoids are present. Unable to keep down food or drink. IV site stays red and swollen for more than 2 days. In the case of an emergency, please go to the emergency room. If you are not having an emergency but are having some of the above symptoms please call the doctor's office at:  Dr. Alan Oliveira [phone]   @[email or web address]@      I have read and understand the above instructions:            ____________________________         ____________________________  Patient or Patient Rep. Signature   Witness Signature      Date: 8/8/2022  Time:

## 2022-08-22 ENCOUNTER — TELEPHONE (OUTPATIENT)
Dept: GASTROENTEROLOGY | Age: 77
End: 2022-08-22

## 2023-02-23 LAB
ALANINE AMINOTRANSFERASE (SGPT) (U/L) IN SER/PLAS: 22 U/L (ref 10–52)
ALBUMIN (G/DL) IN SER/PLAS: 4.3 G/DL (ref 3.4–5)
ALKALINE PHOSPHATASE (U/L) IN SER/PLAS: 67 U/L (ref 33–136)
ANION GAP IN SER/PLAS: 14 MMOL/L (ref 10–20)
ASPARTATE AMINOTRANSFERASE (SGOT) (U/L) IN SER/PLAS: 23 U/L (ref 9–39)
BASOPHILS (10*3/UL) IN BLOOD BY AUTOMATED COUNT: 0.07 X10E9/L (ref 0–0.1)
BASOPHILS/100 LEUKOCYTES IN BLOOD BY AUTOMATED COUNT: 1.1 % (ref 0–2)
BILIRUBIN TOTAL (MG/DL) IN SER/PLAS: 0.6 MG/DL (ref 0–1.2)
CALCIUM (MG/DL) IN SER/PLAS: 10.1 MG/DL (ref 8.6–10.3)
CARBON DIOXIDE, TOTAL (MMOL/L) IN SER/PLAS: 26 MMOL/L (ref 21–32)
CHLORIDE (MMOL/L) IN SER/PLAS: 101 MMOL/L (ref 98–107)
CHOLESTEROL (MG/DL) IN SER/PLAS: 245 MG/DL (ref 0–199)
CHOLESTEROL IN HDL (MG/DL) IN SER/PLAS: 54.1 MG/DL
CHOLESTEROL/HDL RATIO: 4.5
CREATININE (MG/DL) IN SER/PLAS: 1.47 MG/DL (ref 0.5–1.3)
EOSINOPHILS (10*3/UL) IN BLOOD BY AUTOMATED COUNT: 0.39 X10E9/L (ref 0–0.4)
EOSINOPHILS/100 LEUKOCYTES IN BLOOD BY AUTOMATED COUNT: 6.3 % (ref 0–6)
ERYTHROCYTE DISTRIBUTION WIDTH (RATIO) BY AUTOMATED COUNT: 13.8 % (ref 11.5–14.5)
ERYTHROCYTE MEAN CORPUSCULAR HEMOGLOBIN CONCENTRATION (G/DL) BY AUTOMATED: 33.8 G/DL (ref 32–36)
ERYTHROCYTE MEAN CORPUSCULAR VOLUME (FL) BY AUTOMATED COUNT: 99 FL (ref 80–100)
ERYTHROCYTES (10*6/UL) IN BLOOD BY AUTOMATED COUNT: 4.11 X10E12/L (ref 4.5–5.9)
ESTIMATED AVERAGE GLUCOSE FOR HBA1C: 143 MG/DL
GFR MALE: 49 ML/MIN/1.73M2
GLUCOSE (MG/DL) IN SER/PLAS: 147 MG/DL (ref 74–99)
HEMATOCRIT (%) IN BLOOD BY AUTOMATED COUNT: 40.8 % (ref 41–52)
HEMOGLOBIN (G/DL) IN BLOOD: 13.8 G/DL (ref 13.5–17.5)
HEMOGLOBIN A1C/HEMOGLOBIN TOTAL IN BLOOD: 6.6 %
IMMATURE GRANULOCYTES/100 LEUKOCYTES IN BLOOD BY AUTOMATED COUNT: 0.8 % (ref 0–0.9)
LDL: 133 MG/DL (ref 0–99)
LEUKOCYTES (10*3/UL) IN BLOOD BY AUTOMATED COUNT: 6.2 X10E9/L (ref 4.4–11.3)
LYMPHOCYTES (10*3/UL) IN BLOOD BY AUTOMATED COUNT: 1.65 X10E9/L (ref 0.8–3)
LYMPHOCYTES/100 LEUKOCYTES IN BLOOD BY AUTOMATED COUNT: 26.7 % (ref 13–44)
MONOCYTES (10*3/UL) IN BLOOD BY AUTOMATED COUNT: 0.73 X10E9/L (ref 0.05–0.8)
MONOCYTES/100 LEUKOCYTES IN BLOOD BY AUTOMATED COUNT: 11.8 % (ref 2–10)
NEUTROPHILS (10*3/UL) IN BLOOD BY AUTOMATED COUNT: 3.28 X10E9/L (ref 1.6–5.5)
NEUTROPHILS/100 LEUKOCYTES IN BLOOD BY AUTOMATED COUNT: 53.3 % (ref 40–80)
NON HDL CHOLESTEROL: 191 MG/DL
PLATELETS (10*3/UL) IN BLOOD AUTOMATED COUNT: 235 X10E9/L (ref 150–450)
POTASSIUM (MMOL/L) IN SER/PLAS: 4.4 MMOL/L (ref 3.5–5.3)
PROSTATE SPECIFIC AG (NG/ML) IN SER/PLAS: 0.01 NG/ML (ref 0–4)
PROTEIN TOTAL: 7.2 G/DL (ref 6.4–8.2)
SODIUM (MMOL/L) IN SER/PLAS: 137 MMOL/L (ref 136–145)
TESTOSTERONE (NG/DL) IN SER/PLAS: 231 NG/DL (ref 240–1000)
THYROXINE (T4) FREE (NG/DL) IN SER/PLAS: 0.85 NG/DL (ref 0.61–1.12)
TRIGLYCERIDE (MG/DL) IN SER/PLAS: 291 MG/DL (ref 0–149)
URATE (MG/DL) IN SER/PLAS: 7.8 MG/DL (ref 4–7.5)
UREA NITROGEN (MG/DL) IN SER/PLAS: 36 MG/DL (ref 6–23)
VLDL: 58 MG/DL (ref 0–40)

## 2023-03-03 LAB — ESTROGEN,TOTAL: 94 PG/ML (ref 56–213)

## 2023-03-10 ENCOUNTER — TELEPHONE (OUTPATIENT)
Dept: PRIMARY CARE | Facility: CLINIC | Age: 78
End: 2023-03-10
Payer: MEDICARE

## 2023-03-10 NOTE — TELEPHONE ENCOUNTER
Pt is calling in regards to his estrogen blood work results. He is aware that you said the level was normal. Since it's normal, what is his next step? Does he need to see a specialist?   Please advise  Thanks    Pt's # [phone]

## 2023-03-14 DIAGNOSIS — R41.3 MEMORY LOSS: ICD-10-CM

## 2023-03-14 RX ORDER — MEMANTINE HYDROCHLORIDE 21 MG/1
21 CAPSULE, EXTENDED RELEASE ORAL DAILY
COMMUNITY
Start: 2020-11-02 | End: 2023-03-14 | Stop reason: SDUPTHER

## 2023-03-14 NOTE — TELEPHONE ENCOUNTER
Rx Refill Request Telephone Encounter    Name:  Stew Rubin  : 1945     Medication Name:  memantine ER   Dose (Optional):21 mg  Quantity (Optional):90    requesting 2 refills  Directions (Optional)::take 1 capsule by mouth daily    ALLERGIES: shellfish  Statins  Sulfa drugs    [facility]    Date of last appointment:  3/1/23  Date of next appointment:  none  Best number to reach patient:  [phone]

## 2023-03-15 RX ORDER — MEMANTINE HYDROCHLORIDE 21 MG/1
21 CAPSULE, EXTENDED RELEASE ORAL DAILY
Qty: 90 CAPSULE | Refills: 1 | Status: SHIPPED | OUTPATIENT
Start: 2023-03-15 | End: 2023-08-14 | Stop reason: SDUPTHER

## 2023-03-29 DIAGNOSIS — G30.9 ALZHEIMER DISEASE (MULTI): ICD-10-CM

## 2023-03-29 DIAGNOSIS — F02.80 ALZHEIMER DISEASE (MULTI): ICD-10-CM

## 2023-03-29 RX ORDER — DONEPEZIL HYDROCHLORIDE 10 MG/1
10 TABLET, FILM COATED ORAL NIGHTLY
Qty: 90 TABLET | Refills: 1 | Status: SHIPPED | OUTPATIENT
Start: 2023-03-29 | End: 2023-11-13 | Stop reason: SDUPTHER

## 2023-03-29 NOTE — TELEPHONE ENCOUNTER
Rx Refill Request Telephone Encounter    Name:  Stew Rubin  : 1945     Medication Name:  DONEPEZIL  Dose (Optional):    10MG  Quantity (Optional):    90  Directions (Optional):   1 TABLET DAILY    ALLERGIES:   STATINS,SULFA, SHELLFISH    Specific Pharmacy location:  [facility]    Date of last appointment:  2023  Date of next appointment:      Best number to reach patient:  [phone]

## 2023-04-24 DIAGNOSIS — G62.9 NEUROPATHY: ICD-10-CM

## 2023-04-24 NOTE — TELEPHONE ENCOUNTER
Rx Refill Request Telephone Encounter    Name: Stew Rubin  :  1945    Medication Name:   GABAPENTIN  Dose (Optional):   300MG  Quantity (Optional):  90 TABLETS   Directions (Optional):   TAKE as DIRECTED PRN    ALLERGIES:    STATINS, SULFA    LAST DRUG SCREEN:     2022  LAST MED CONTRACT:    2022    Specific Pharmacy location:    [facility]     Date of last appointment:    2023  Date of next appointment:        Best number to reach patient:    [phone]

## 2023-04-25 RX ORDER — GABAPENTIN 300 MG/1
300 CAPSULE ORAL DAILY
Qty: 90 CAPSULE | Refills: 1 | Status: SHIPPED | OUTPATIENT
Start: 2023-04-25 | End: 2023-09-18 | Stop reason: SDUPTHER

## 2023-04-25 RX ORDER — GABAPENTIN 300 MG/1
300 CAPSULE ORAL DAILY
COMMUNITY
Start: 2021-11-22 | End: 2023-04-25 | Stop reason: SDUPTHER

## 2023-05-10 DIAGNOSIS — I10 HYPERTENSION, UNSPECIFIED TYPE: ICD-10-CM

## 2023-05-10 DIAGNOSIS — E78.2 MIXED HYPERLIPIDEMIA: ICD-10-CM

## 2023-05-10 DIAGNOSIS — G62.9 NEUROPATHY: ICD-10-CM

## 2023-05-10 RX ORDER — METOPROLOL TARTRATE 50 MG/1
1 TABLET ORAL 2 TIMES DAILY
COMMUNITY
Start: 2018-07-19 | End: 2023-05-10 | Stop reason: SDUPTHER

## 2023-05-10 RX ORDER — METOPROLOL TARTRATE 50 MG/1
50 TABLET ORAL 2 TIMES DAILY
Qty: 180 TABLET | Refills: 0 | Status: SHIPPED | OUTPATIENT
Start: 2023-05-10 | End: 2023-08-14 | Stop reason: SDUPTHER

## 2023-05-10 NOTE — TELEPHONE ENCOUNTER
Rx Refill Request Telephone Encounter    Name:  Stew Rubin  : 1945     Medication Name:  METOPROLOL TARTRATE  Dose (Optional):    50 MG  Quantity (Optional):    180  Directions (Optional):   TAKE 1 BID    Medication Name:  PRAVASTATIN  Dose (Optional):    20 MG  Quantity (Optional):    90  Directions (Optional):   TAKE 1 DAILY    ALLERGIES:   STATINS, SULFA, SHELLFISH    Specific [facility] location:  [facility]    Date of last appointment:  2023  Date of next appointment:  NONE    Best number to reach patient:  [phone]

## 2023-05-19 RX ORDER — EZETIMIBE 10 MG/1
TABLET ORAL
COMMUNITY
Start: 2023-02-18 | End: 2023-05-19 | Stop reason: SDUPTHER

## 2023-05-19 RX ORDER — PRAVASTATIN SODIUM 20 MG/1
20 TABLET ORAL NIGHTLY
Qty: 90 TABLET | Refills: 0 | Status: SHIPPED | OUTPATIENT
Start: 2023-05-19 | End: 2023-08-14 | Stop reason: SDUPTHER

## 2023-05-19 RX ORDER — PRAVASTATIN SODIUM 20 MG/1
1 TABLET ORAL NIGHTLY
COMMUNITY
Start: 2018-01-15 | End: 2023-05-19 | Stop reason: SDUPTHER

## 2023-05-19 RX ORDER — EZETIMIBE 10 MG/1
TABLET ORAL
Qty: 90 TABLET | Refills: 1 | Status: SHIPPED | OUTPATIENT
Start: 2023-05-19 | End: 2023-11-13 | Stop reason: SDUPTHER

## 2023-05-19 NOTE — TELEPHONE ENCOUNTER
PATIENT CALLED ABOUT PRAVASTATIN - REQUESTED IT IN THE PHONE MESSAGE,BUT NOTHING WAS EVER SENT OVER.  PLEASE SEND TO [facility].

## 2023-08-14 DIAGNOSIS — I10 HYPERTENSION, UNSPECIFIED TYPE: ICD-10-CM

## 2023-08-14 DIAGNOSIS — E78.2 MIXED HYPERLIPIDEMIA: ICD-10-CM

## 2023-08-14 DIAGNOSIS — R41.3 MEMORY LOSS: ICD-10-CM

## 2023-08-14 RX ORDER — MEMANTINE HYDROCHLORIDE 21 MG/1
21 CAPSULE, EXTENDED RELEASE ORAL DAILY
Qty: 90 CAPSULE | Refills: 1 | Status: SHIPPED | OUTPATIENT
Start: 2023-08-14 | End: 2024-04-22 | Stop reason: SDUPTHER

## 2023-08-14 RX ORDER — PRAVASTATIN SODIUM 20 MG/1
20 TABLET ORAL NIGHTLY
Qty: 90 TABLET | Refills: 1 | Status: SHIPPED | OUTPATIENT
Start: 2023-08-14 | End: 2023-11-13 | Stop reason: SDUPTHER

## 2023-08-14 RX ORDER — METOPROLOL TARTRATE 50 MG/1
50 TABLET ORAL DAILY
Qty: 90 TABLET | Refills: 1 | Status: SHIPPED | OUTPATIENT
Start: 2023-08-14 | End: 2023-11-13 | Stop reason: SDUPTHER

## 2023-08-14 NOTE — TELEPHONE ENCOUNTER
Rx Refill Request Telephone Encounter    Name:  Stew Rubin  : 1945     Medication Name:  PRAVASTATIN  Dose (Optional):    20 MG  Quantity (Optional):    90  Directions (Optional):   TAKE 1 DAILY    Medication Name:  METOPROLOL  Dose (Optional):    50 MG  Quantity (Optional):    90  Directions (Optional):   TAKE 1 DAILY    Medication Name:  NEMENDA  Dose (Optional):    21 MG  Quantity (Optional):    90  Directions (Optional):   TAKE 1 DAILY    ALLERGIES:   NKDA    Specific Pharmacy location:  [facility]    Date of last appointment:  2023  Date of next appointment:  NONE    Best number to reach patient:  [phone]

## 2023-09-18 ENCOUNTER — TELEPHONE (OUTPATIENT)
Dept: PRIMARY CARE | Facility: CLINIC | Age: 78
End: 2023-09-18
Payer: MEDICARE

## 2023-09-18 DIAGNOSIS — G62.9 NEUROPATHY: ICD-10-CM

## 2023-09-18 RX ORDER — GABAPENTIN 300 MG/1
300 CAPSULE ORAL DAILY
Qty: 90 CAPSULE | Refills: 1 | Status: SHIPPED | OUTPATIENT
Start: 2023-09-18 | End: 2024-01-11 | Stop reason: SDUPTHER

## 2023-09-18 NOTE — TELEPHONE ENCOUNTER
Last seen 3-1-23. Patient needs an appointment. Please call to schedule, and let us know if a short supply is needed. Thank you!

## 2023-09-18 NOTE — TELEPHONE ENCOUNTER
Rx Refill Request Telephone Encounter    Name:  Stew Rbuin  : 1945     Medication Name:  Gabapentin 300 MG  Quantity (Optional):    90 Refill: 1  Directions (Optional):   Take 1 capsule (300 mg) by mouth once daily. PRN     Specific Pharmacy location:  [facility]     Date of last appointment:  3/1/2023    Pt is OUT of medication

## 2023-09-26 RX ORDER — GABAPENTIN 300 MG/1
CAPSULE ORAL
Refills: 0 | OUTPATIENT
Start: 2023-09-26

## 2023-10-25 ENCOUNTER — TELEPHONE (OUTPATIENT)
Dept: PRIMARY CARE | Facility: CLINIC | Age: 78
End: 2023-10-25
Payer: MEDICARE

## 2023-10-25 NOTE — TELEPHONE ENCOUNTER
PATIENT CALLED FOR REFILLS ON:    DONEPEZIL 10 MG - 1 TABLET DAILY AT BEDTIME    SPIRONOLACTONE 25 MG - 1 TABLET DAILY    [facility]    LAST OV: 3/1/23    LM[facility] TO RETURN CALL - NEEDS TO SCHEDULE APPT      Purse String (Intermediate) Text: Given the location of the defect and the characteristics of the surrounding skin a purse string intermediate closure was deemed most appropriate.  Undermining was performed circumfirentially around the surgical defect.  A purse string suture was then placed and tightened.

## 2023-11-02 PROBLEM — G47.30 SLEEP APNEA: Status: ACTIVE | Noted: 2023-11-02

## 2023-11-02 PROBLEM — G62.9 NEUROPATHY: Status: ACTIVE | Noted: 2023-11-02

## 2023-11-02 PROBLEM — R41.3 MEMORY LOSS: Status: ACTIVE | Noted: 2023-11-02

## 2023-11-02 PROBLEM — I25.10 CAD (CORONARY ARTERY DISEASE): Status: ACTIVE | Noted: 2023-11-02

## 2023-11-02 PROBLEM — E61.1 IRON DEFICIENCY: Status: ACTIVE | Noted: 2023-11-02

## 2023-11-02 PROBLEM — E78.2 MIXED HYPERLIPIDEMIA: Status: ACTIVE | Noted: 2023-11-02

## 2023-11-02 PROBLEM — G57.10 MERALGIA PARAESTHETICA: Status: ACTIVE | Noted: 2023-11-02

## 2023-11-02 PROBLEM — E78.00 HYPERCHOLESTEROLEMIA: Status: ACTIVE | Noted: 2023-11-02

## 2023-11-02 PROBLEM — E11.40 CONTROLLED DIABETES MELLITUS WITH DIABETIC NEUROPATHY, WITHOUT LONG-TERM CURRENT USE OF INSULIN (MULTI): Status: ACTIVE | Noted: 2023-11-02

## 2023-11-02 PROBLEM — N40.0 BENIGN PROSTATIC HYPERPLASIA: Status: ACTIVE | Noted: 2023-11-02

## 2023-11-02 PROBLEM — I48.91 A-FIB (MULTI): Status: ACTIVE | Noted: 2023-11-02

## 2023-11-02 PROBLEM — R51.9 CHRONIC NONINTRACTABLE HEADACHE: Status: ACTIVE | Noted: 2023-11-02

## 2023-11-02 PROBLEM — E87.6 HYPOKALEMIA: Status: ACTIVE | Noted: 2023-11-02

## 2023-11-02 PROBLEM — G89.29 CHRONIC NONINTRACTABLE HEADACHE: Status: ACTIVE | Noted: 2023-11-02

## 2023-11-02 PROBLEM — Z85.46 HISTORY OF PROSTATE CANCER: Status: ACTIVE | Noted: 2023-11-02

## 2023-11-02 PROBLEM — I10 ESSENTIAL HYPERTENSION: Status: ACTIVE | Noted: 2023-11-02

## 2023-11-02 PROBLEM — M10.9 GOUT: Status: ACTIVE | Noted: 2023-11-02

## 2023-11-02 PROBLEM — I73.9 PERIPHERAL ARTERY DISEASE (CMS-HCC): Status: ACTIVE | Noted: 2023-11-02

## 2023-11-02 PROBLEM — Z95.1 HISTORY OF CORONARY ARTERY BYPASS GRAFT: Status: ACTIVE | Noted: 2023-11-02

## 2023-11-13 ENCOUNTER — OFFICE VISIT (OUTPATIENT)
Dept: PRIMARY CARE | Facility: CLINIC | Age: 78
End: 2023-11-13
Payer: MEDICARE

## 2023-11-13 VITALS
RESPIRATION RATE: 16 BRPM | DIASTOLIC BLOOD PRESSURE: 78 MMHG | OXYGEN SATURATION: 99 % | TEMPERATURE: 96.6 F | HEART RATE: 63 BPM | HEIGHT: 71 IN | SYSTOLIC BLOOD PRESSURE: 136 MMHG | BODY MASS INDEX: 34.24 KG/M2 | WEIGHT: 244.6 LBS

## 2023-11-13 DIAGNOSIS — E11.40 CONTROLLED TYPE 2 DIABETES MELLITUS WITH DIABETIC NEUROPATHY, WITHOUT LONG-TERM CURRENT USE OF INSULIN (MULTI): ICD-10-CM

## 2023-11-13 DIAGNOSIS — G30.9 ALZHEIMER DISEASE (MULTI): ICD-10-CM

## 2023-11-13 DIAGNOSIS — I10 ESSENTIAL HYPERTENSION: ICD-10-CM

## 2023-11-13 DIAGNOSIS — F02.80 ALZHEIMER DISEASE (MULTI): ICD-10-CM

## 2023-11-13 DIAGNOSIS — Z13.0 SCREENING, ANEMIA, DEFICIENCY, IRON: ICD-10-CM

## 2023-11-13 DIAGNOSIS — I10 HYPERTENSION, UNSPECIFIED TYPE: ICD-10-CM

## 2023-11-13 DIAGNOSIS — E78.00 HYPERCHOLESTEROLEMIA: ICD-10-CM

## 2023-11-13 DIAGNOSIS — E78.2 MIXED HYPERLIPIDEMIA: ICD-10-CM

## 2023-11-13 PROCEDURE — 3075F SYST BP GE 130 - 139MM HG: CPT | Performed by: FAMILY MEDICINE

## 2023-11-13 PROCEDURE — 1036F TOBACCO NON-USER: CPT | Performed by: FAMILY MEDICINE

## 2023-11-13 PROCEDURE — 1159F MED LIST DOCD IN RCRD: CPT | Performed by: FAMILY MEDICINE

## 2023-11-13 PROCEDURE — 99213 OFFICE O/P EST LOW 20 MIN: CPT | Performed by: FAMILY MEDICINE

## 2023-11-13 PROCEDURE — 3078F DIAST BP <80 MM HG: CPT | Performed by: FAMILY MEDICINE

## 2023-11-13 RX ORDER — EZETIMIBE 10 MG/1
TABLET ORAL
Qty: 90 TABLET | Refills: 1 | Status: SHIPPED | OUTPATIENT
Start: 2023-11-13 | End: 2024-04-22 | Stop reason: SDUPTHER

## 2023-11-13 RX ORDER — INDAPAMIDE 1.25 MG/1
1.25 TABLET ORAL
COMMUNITY
Start: 2022-11-16 | End: 2023-11-13 | Stop reason: SDUPTHER

## 2023-11-13 RX ORDER — SPIRONOLACTONE 25 MG/1
12.5 TABLET ORAL
COMMUNITY
Start: 2022-11-16 | End: 2023-11-13 | Stop reason: SDUPTHER

## 2023-11-13 RX ORDER — INDAPAMIDE 1.25 MG/1
1.25 TABLET ORAL
Qty: 90 TABLET | Refills: 1 | Status: SHIPPED | OUTPATIENT
Start: 2023-11-13 | End: 2024-04-22 | Stop reason: SDUPTHER

## 2023-11-13 RX ORDER — METOPROLOL TARTRATE 50 MG/1
50 TABLET ORAL DAILY
Qty: 90 TABLET | Refills: 1 | Status: SHIPPED | OUTPATIENT
Start: 2023-11-13 | End: 2024-01-05 | Stop reason: SDUPTHER

## 2023-11-13 RX ORDER — PRAVASTATIN SODIUM 20 MG/1
20 TABLET ORAL NIGHTLY
Qty: 90 TABLET | Refills: 1 | Status: SHIPPED | OUTPATIENT
Start: 2023-11-13 | End: 2024-03-05 | Stop reason: SDUPTHER

## 2023-11-13 RX ORDER — SPIRONOLACTONE 25 MG/1
12.5 TABLET ORAL
Qty: 45 TABLET | Refills: 1 | Status: SHIPPED | OUTPATIENT
Start: 2023-11-13 | End: 2024-01-30 | Stop reason: SDUPTHER

## 2023-11-13 RX ORDER — DONEPEZIL HYDROCHLORIDE 10 MG/1
10 TABLET, FILM COATED ORAL NIGHTLY
Qty: 90 TABLET | Refills: 1 | Status: SHIPPED | OUTPATIENT
Start: 2023-11-13 | End: 2024-02-05 | Stop reason: SDUPTHER

## 2023-11-13 NOTE — PROGRESS NOTES
Subjective   Patient ID: Stew Rubin is a 78 y.o. male who presents for No chief complaint on file..  HPI    HTN and Hyerlipidemia follow up  Denies chest pain, swelling, headaches, lightheadedness or dizziness.   Pt is having some SOB  Eats a generally healthy diet, Exercises.   Does not check BP at home.   Currently taking Metoprolol Tar, Spironolactone, Zetia, Pravastatin    No other concern  Review of Systems  Constitutional:  no chills, no fever and no night sweats.  Eyes: no blurred vision and no eyesight problems.  ENT: no hearing loss, no nasal congestion, no hoarseness and no sore throat.  Neck: no mass (es) and no swelling.  Cardiovascular: no chest pain, no intermittent leg claudication, no lower extremity edema, no palpitation and no syncope.  Respiratory: no cough, no shortness of breath during exertion, no shortness of breath at rest and no wheezing.  Gastrointestinal: no abdominal pain, no blood in stools, no constipation, no diarrhea, no melena, no nausea, no rectal pain and no vomiting.  Genitourinary: no dysuria, no change in urinary frequency, no urinary hesitancy and no feelings of urinary urgency.  Musculoskeletal: no arthralgias, no back pain and no myalgias.  Integumentary: no new skin lesions and no rashes.  Neurological: no difficulty walking, no headache, no limb weakness, no numbness and no tingling.  Psychiatric/Behavioral: no anxiety, no depression, no anhedonia and no substance use disorders.  Endocrine: no recent weight gain and no recent weight loss.  Hematologic/Lymphatic: no tendency for easy bruising and no swollen glands    Objective   Physical Exam  Patient in for medication refill routine 6-month follow-up recently had a nuclear stress test at the [facility] was normal he was having some shortness of breath with exertion and this is been decided to be most likely related to his weight age and cardiovascular fitness level he is still doing some things around the house.   Has not lost any of the weight but has not gained any more.  Plan is to refill his prescriptions and we will follow-up with him in 6 months he has no other complaints today lungs are clear cardiac and abdominal exams benign no peripheral edema.  There were no vitals taken for this visit.    Lab Results   Component Value Date    WBC 6.2 02/23/2023    HGB 13.8 02/23/2023    HCT 40.8 (L) 02/23/2023    MCV 99 02/23/2023     02/23/2023       Assessment/Plan   Problem List Items Addressed This Visit       Controlled diabetes mellitus with diabetic neuropathy, without long-term current use of insulin (CMS/[facility])    Essential hypertension    Hypercholesterolemia    Mixed hyperlipidemia     Other Visit Diagnoses       Screening, anemia, deficiency, iron

## 2023-12-21 ENCOUNTER — TELEPHONE (OUTPATIENT)
Dept: PRIMARY CARE | Facility: CLINIC | Age: 78
End: 2023-12-21
Payer: MEDICARE

## 2023-12-21 DIAGNOSIS — J01.90 ACUTE NON-RECURRENT SINUSITIS, UNSPECIFIED LOCATION: Primary | ICD-10-CM

## 2023-12-21 RX ORDER — AZITHROMYCIN 250 MG/1
TABLET, FILM COATED ORAL
Qty: 6 TABLET | Refills: 0 | Status: SHIPPED | OUTPATIENT
Start: 2023-12-21 | End: 2023-12-25

## 2023-12-21 NOTE — TELEPHONE ENCOUNTER
PATIENT IS CALLING - HIS WIFE HAS BEEN SICK RECENTLY, NOW HE HAS CAUGHT THE BUG FROM HER,  HE IS EXPERIENCING CONGESTION, COUGHING, FEVER - YOU GAVE HIS WIFE A Z AGUSTÍN - WONDERING IF YOU CAN SEND ONE OVER FOR HIM?  PLEASE ADVISE.      [facility]

## 2024-01-05 ENCOUNTER — OFFICE VISIT (OUTPATIENT)
Dept: PRIMARY CARE | Facility: CLINIC | Age: 79
End: 2024-01-05
Payer: MEDICARE

## 2024-01-05 VITALS
RESPIRATION RATE: 12 BRPM | TEMPERATURE: 98.6 F | SYSTOLIC BLOOD PRESSURE: 124 MMHG | HEART RATE: 68 BPM | WEIGHT: 233.6 LBS | BODY MASS INDEX: 32.7 KG/M2 | DIASTOLIC BLOOD PRESSURE: 80 MMHG | HEIGHT: 71 IN

## 2024-01-05 DIAGNOSIS — E61.1 IRON DEFICIENCY: ICD-10-CM

## 2024-01-05 DIAGNOSIS — G62.9 NEUROPATHY: Primary | ICD-10-CM

## 2024-01-05 DIAGNOSIS — I10 HYPERTENSION, UNSPECIFIED TYPE: ICD-10-CM

## 2024-01-05 DIAGNOSIS — M62.81 MUSCLE WEAKNESS: ICD-10-CM

## 2024-01-05 DIAGNOSIS — E78.2 MIXED HYPERLIPIDEMIA: ICD-10-CM

## 2024-01-05 DIAGNOSIS — E11.40 CONTROLLED TYPE 2 DIABETES MELLITUS WITH DIABETIC NEUROPATHY, WITHOUT LONG-TERM CURRENT USE OF INSULIN (MULTI): ICD-10-CM

## 2024-01-05 PROCEDURE — 1159F MED LIST DOCD IN RCRD: CPT | Performed by: FAMILY MEDICINE

## 2024-01-05 PROCEDURE — 3079F DIAST BP 80-89 MM HG: CPT | Performed by: FAMILY MEDICINE

## 2024-01-05 PROCEDURE — 1036F TOBACCO NON-USER: CPT | Performed by: FAMILY MEDICINE

## 2024-01-05 PROCEDURE — 99213 OFFICE O/P EST LOW 20 MIN: CPT | Performed by: FAMILY MEDICINE

## 2024-01-05 PROCEDURE — 3074F SYST BP LT 130 MM HG: CPT | Performed by: FAMILY MEDICINE

## 2024-01-05 RX ORDER — METOPROLOL TARTRATE 50 MG/1
50 TABLET ORAL 2 TIMES DAILY
Qty: 180 TABLET | Refills: 3 | Status: SHIPPED | OUTPATIENT
Start: 2024-01-05 | End: 2024-01-30 | Stop reason: SDUPTHER

## 2024-01-05 RX ORDER — CHLORTHALIDONE 25 MG/1
12.5 TABLET ORAL DAILY
COMMUNITY
End: 2024-01-05 | Stop reason: SINTOL

## 2024-01-05 RX ORDER — METOPROLOL TARTRATE 50 MG/1
50 TABLET ORAL 2 TIMES DAILY
Qty: 14 TABLET | Refills: 0 | Status: SHIPPED | OUTPATIENT
Start: 2024-01-05 | End: 2024-01-22

## 2024-01-05 NOTE — PROGRESS NOTES
Subjective   Patient ID: Stew Rubin is a 78 y.o. male who presents for Neuropathy and Discuss Medications.    HPI  Presents to discuss possible therapy for legs  Pt has neuropathy of both legs  Complaining weakness in his muscles x 1 year. States his muscle strength has declined rapidly. His SOB started 6 months ago.   Went to [facility] yesterday. States he is going to be having another stress test done, and will do muscle rehab.    Takes 1/2 of a 25 MG Chlorthalidone daily. He cannot cut these in half, or they break, wondering if there is a lower dose?      Has no other new problem /question.    Review of Systems  Constitutional:  no chills, no fever and no night sweats.  Eyes: no blurred vision and no eyesight problems.  ENT: no hearing loss, no nasal congestion, no hoarseness and no sore throat.  Neck: no mass (es) and no swelling.  Cardiovascular: no chest pain, no intermittent leg claudication, no lower extremity edema, no palpitation and no syncope.  Respiratory: no cough, no shortness of breath during exertion, no shortness of breath at rest and no wheezing.  Gastrointestinal: no abdominal pain, no blood in stools, no constipation, no diarrhea, no melena, no nausea, no rectal pain and no vomiting.  Genitourinary: no dysuria, no change in urinary frequency, no urinary hesitancy and no feelings of urinary urgency.  Musculoskeletal: no arthralgias, no back pain and no myalgias.  Integumentary: no new skin lesions and no rashes.  Neurological: no difficulty walking, no headache, no limb weakness, no numbness and no tingling.  Psychiatric/Behavioral: no anxiety, no depression, no anhedonia and no substance use disorders.  Endocrine: no recent weight gain and no recent weight loss.  Hematologic/Lymphatic: no tendency for easy bruising and no swollen glands    Objective   Physical Exam  Patient history of neuropathy complains of muscle soreness weakness was evaluated [facility] they are going to try doing cardiac  "rehab increase shortness of breath although no illness.  He has been complaining of this worsening over the past few years his arterial stent left leg sleeps with his feet elevated to keep edema down has not lost any significant weight.  He has been taking chlorthalidone but he cannot cut it in half which is shattered we will try a lower dose of 15 mg instead of the 12-1/2 he was shooting for by chopping the 25 mg and a half.  Will see how he does with that refill his metoprolol 50 mg twice daily refill lab form also physical exam today's office visit constitutional alert and oriented x3.    Head is atraumatic HEENT is within normal limits.    Neck supple no masses full range of motion.    Thyroid is normal in size no thyromegaly there is no carotid bruits.    Pulmonary exam shows clear to auscultation no respiratory distress.    Cardiovascular shows no murmur rub or gallop.  Regular rate and rhythm.    Abdominal exam soft nontender no hepatosplenomegaly or masses normal bowel sounds no rebound no guarding.    Musculoskeletal exam no joint pain no muscle pain full range of motion.    Psych exam normal mood and affect.    Dermatologic exam no skin lesions no rash no blemishes.    Neuro exam is no focal deficits.  Normal exam.    Extremities no edema normal pulses normal capillary refill.    Ht 1.803 m (5' 11\")   Wt 106 kg (233 lb 9.6 oz)   BMI 32.58 kg/m²     Lab Results   Component Value Date    WBC 6.2 02/23/2023    HGB 13.8 02/23/2023    HCT 40.8 (L) 02/23/2023    MCV 99 02/23/2023     02/23/2023       Assessment/Plan plan refill medication try rehab if its does not help he may want to go back to rehab at  which she felt was very helpful the last time he let us know.  Problem List Items Addressed This Visit       Controlled diabetes mellitus with diabetic neuropathy, without long-term current use of insulin (CMS/[facility])    Mixed hyperlipidemia    Iron deficiency    Neuropathy - Primary     Other Visit " Diagnoses       Muscle weakness        Hypertension, unspecified type

## 2024-01-06 DIAGNOSIS — I10 HYPERTENSION, UNSPECIFIED TYPE: ICD-10-CM

## 2024-01-08 ENCOUNTER — TELEPHONE (OUTPATIENT)
Dept: PRIMARY CARE | Facility: CLINIC | Age: 79
End: 2024-01-08

## 2024-01-08 ENCOUNTER — LAB (OUTPATIENT)
Dept: LAB | Facility: LAB | Age: 79
End: 2024-01-08
Payer: MEDICARE

## 2024-01-08 DIAGNOSIS — M62.81 MUSCLE WEAKNESS: ICD-10-CM

## 2024-01-08 DIAGNOSIS — E11.40 CONTROLLED TYPE 2 DIABETES MELLITUS WITH DIABETIC NEUROPATHY, WITHOUT LONG-TERM CURRENT USE OF INSULIN (MULTI): ICD-10-CM

## 2024-01-08 DIAGNOSIS — E78.2 MIXED HYPERLIPIDEMIA: ICD-10-CM

## 2024-01-08 LAB
ANION GAP SERPL CALC-SCNC: 16 MMOL/L (ref 10–20)
BUN SERPL-MCNC: 25 MG/DL (ref 6–23)
CALCIUM SERPL-MCNC: 9.7 MG/DL (ref 8.6–10.3)
CHLORIDE SERPL-SCNC: 100 MMOL/L (ref 98–107)
CO2 SERPL-SCNC: 29 MMOL/L (ref 21–32)
CREAT SERPL-MCNC: 1.23 MG/DL (ref 0.5–1.3)
EGFRCR SERPLBLD CKD-EPI 2021: 60 ML/MIN/1.73M*2
EST. AVERAGE GLUCOSE BLD GHB EST-MCNC: 146 MG/DL
GLUCOSE SERPL-MCNC: 154 MG/DL (ref 74–99)
HBA1C MFR BLD: 6.7 %
POTASSIUM SERPL-SCNC: 4.5 MMOL/L (ref 3.5–5.3)
SODIUM SERPL-SCNC: 140 MMOL/L (ref 136–145)
TESTOST SERPL-MCNC: 330 NG/DL (ref 240–1000)

## 2024-01-08 PROCEDURE — 83036 HEMOGLOBIN GLYCOSYLATED A1C: CPT

## 2024-01-08 PROCEDURE — 84403 ASSAY OF TOTAL TESTOSTERONE: CPT

## 2024-01-08 PROCEDURE — 80048 BASIC METABOLIC PNL TOTAL CA: CPT

## 2024-01-08 PROCEDURE — 36415 COLL VENOUS BLD VENIPUNCTURE: CPT

## 2024-01-08 RX ORDER — CHLORTHALIDONE 15 MG/1
TABLET ORAL
Qty: 90 TABLET | Refills: 1 | Status: SHIPPED | OUTPATIENT
Start: 2024-01-08 | End: 2024-05-10 | Stop reason: WASHOUT

## 2024-01-08 NOTE — TELEPHONE ENCOUNTER
Thalitone 15 mg is on backorder, please advise of any changes?   
Implemented All Fall with Harm Risk Interventions:  [place] to call system. Call bell, personal items and telephone within reach. Instruct patient to call for assistance. Room bathroom lighting operational. Non-slip footwear when patient is off stretcher. Physically safe environment: no spills, clutter or unnecessary equipment. Stretcher in lowest position, wheels locked, appropriate side rails in place. Provide visual cue, wrist band, yellow gown, etc. Monitor gait and stability. Monitor for mental status changes and reorient to person, place, and time. Review medications for side effects contributing to fall risk. Reinforce activity limits and safety measures with patient and family. Provide visual clues: red socks.

## 2024-01-08 NOTE — TELEPHONE ENCOUNTER
----- Message from Garrett Ha MD sent at 1/8/2024  3:16 PM EST -----  Labs are stable.  Good glucose control

## 2024-01-10 ENCOUNTER — TELEPHONE (OUTPATIENT)
Dept: PRIMARY CARE | Facility: CLINIC | Age: 79
End: 2024-01-10
Payer: MEDICARE

## 2024-01-10 NOTE — TELEPHONE ENCOUNTER
Ivis, pharmacy tech from [facility] is calling in regards to the rx for Thalitone 15 mg, it is on back order. She couldn't give me any alternative medication that is similar. She said it's up to the provider what to prescribe  Please send in a different medication?    Thanks    [facility] # [phone]  Reference # [id number]

## 2024-01-10 NOTE — TELEPHONE ENCOUNTER
LMOM for patient to call back.    Garrett Ha MD  Do [id number] [facility]1 Clinical Support Staff11 minutes ago (11:03 AM)       Have the patient call some local pharmacies and see if it is available anywhere else if not he will have to go back to the 25 mg and either take 1 every other day or continue to try to cut it in half.

## 2024-01-11 ENCOUNTER — OFFICE VISIT (OUTPATIENT)
Dept: PRIMARY CARE | Facility: CLINIC | Age: 79
End: 2024-01-11
Payer: MEDICARE

## 2024-01-11 VITALS
RESPIRATION RATE: 20 BRPM | HEART RATE: 65 BPM | OXYGEN SATURATION: 97 % | TEMPERATURE: 96.7 F | DIASTOLIC BLOOD PRESSURE: 60 MMHG | SYSTOLIC BLOOD PRESSURE: 130 MMHG

## 2024-01-11 DIAGNOSIS — I10 ESSENTIAL HYPERTENSION: ICD-10-CM

## 2024-01-11 DIAGNOSIS — G62.9 NEUROPATHY: ICD-10-CM

## 2024-01-11 PROCEDURE — 99213 OFFICE O/P EST LOW 20 MIN: CPT | Performed by: FAMILY MEDICINE

## 2024-01-11 PROCEDURE — 3075F SYST BP GE 130 - 139MM HG: CPT | Performed by: FAMILY MEDICINE

## 2024-01-11 PROCEDURE — 1159F MED LIST DOCD IN RCRD: CPT | Performed by: FAMILY MEDICINE

## 2024-01-11 PROCEDURE — 3078F DIAST BP <80 MM HG: CPT | Performed by: FAMILY MEDICINE

## 2024-01-11 PROCEDURE — 1036F TOBACCO NON-USER: CPT | Performed by: FAMILY MEDICINE

## 2024-01-11 RX ORDER — GABAPENTIN 300 MG/1
300 CAPSULE ORAL DAILY
Qty: 90 CAPSULE | Refills: 1 | Status: SHIPPED | OUTPATIENT
Start: 2024-01-11 | End: 2024-04-22 | Stop reason: SDUPTHER

## 2024-01-11 RX ORDER — AMLODIPINE BESYLATE 5 MG/1
5 TABLET ORAL DAILY
Qty: 30 TABLET | Refills: 3 | Status: SHIPPED | OUTPATIENT
Start: 2024-01-11 | End: 2024-01-30 | Stop reason: SDUPTHER

## 2024-01-11 NOTE — TELEPHONE ENCOUNTER
Pt would like to wait until [facility] [employer] [employer] gets this back in stock. [facility] cannot tell us when this will become available. They will send a letter to the patient.

## 2024-01-11 NOTE — TELEPHONE ENCOUNTER
Rx Refill Request Telephone Encounter    Name:  Stew Rubin  : 1945     Medication Name:  gabapentin (Neurontin) 300 mg capsule   Quantity (Optional):    90  Directions (Optional):   Take 1 capsule (300 mg) by mouth once daily. PRN     Specific Pharmacy location:  [facility]     Date of last appointment:  24  Date of next appointment:  24

## 2024-01-17 ENCOUNTER — CLINICAL SUPPORT (OUTPATIENT)
Dept: PRIMARY CARE | Facility: CLINIC | Age: 79
End: 2024-01-17
Payer: MEDICARE

## 2024-01-17 DIAGNOSIS — E29.1 TESTICULAR HYPOFUNCTION: Primary | ICD-10-CM

## 2024-01-17 PROCEDURE — 96372 THER/PROPH/DIAG INJ SC/IM: CPT | Performed by: FAMILY MEDICINE

## 2024-01-17 RX ORDER — TESTOSTERONE CYPIONATE 200 MG/ML
200 INJECTION, SOLUTION INTRAMUSCULAR
Status: CANCELLED | OUTPATIENT
Start: 2024-01-17 | End: 2024-03-13

## 2024-01-17 RX ADMIN — TESTOSTERONE CYPIONATE 200 MG: 200 INJECTION, SOLUTION INTRAMUSCULAR at 09:34

## 2024-01-18 ENCOUNTER — TELEPHONE (OUTPATIENT)
Dept: PRIMARY CARE | Facility: CLINIC | Age: 79
End: 2024-01-18

## 2024-01-18 DIAGNOSIS — I10 HYPERTENSION, UNSPECIFIED TYPE: ICD-10-CM

## 2024-01-18 RX ORDER — METOPROLOL TARTRATE 50 MG/1
50 TABLET ORAL 2 TIMES DAILY
Qty: 14 TABLET | Refills: 0 | Status: CANCELLED | OUTPATIENT
Start: 2024-01-18 | End: 2024-01-25

## 2024-01-18 NOTE — TELEPHONE ENCOUNTER
PATIENT CALLING TO ASK FOR A 1 WEEK SUPPLY OF THE METOPROLOL TARTRATE 50MG TO [facility] PHARM[facility]Y IS DELAYED IN SENDING HIS PRESCRIPTION AND PATIENT IS OUT OF MEDS.   PLEASE ADVISE OR SEND TO APPROPRIATE PROVIDER FOR RESPONSE

## 2024-01-19 RX ORDER — TESTOSTERONE CYPIONATE 200 MG/ML
200 INJECTION, SOLUTION INTRAMUSCULAR
Status: SHIPPED | OUTPATIENT
Start: 2024-01-19 | End: 2024-04-12

## 2024-01-20 DIAGNOSIS — I10 HYPERTENSION, UNSPECIFIED TYPE: ICD-10-CM

## 2024-01-22 RX ORDER — METOPROLOL TARTRATE 50 MG/1
50 TABLET ORAL 2 TIMES DAILY
Qty: 14 TABLET | Refills: 0 | Status: SHIPPED | OUTPATIENT
Start: 2024-01-22 | End: 2024-01-29

## 2024-01-24 NOTE — PROGRESS NOTES
Patient presents for testosterone injection  200 MG IM in  Right  gluteal region  Pt. to return in 4  weeks for next injection  Pt. tolerated injection well      Last testosterone labs:1/8/24  Last CSA:1/17/24    Pt. has no complaints or concerns

## 2024-01-29 ENCOUNTER — TELEPHONE (OUTPATIENT)
Dept: PRIMARY CARE | Facility: CLINIC | Age: 79
End: 2024-01-29
Payer: MEDICARE

## 2024-01-29 NOTE — TELEPHONE ENCOUNTER
Patient call in stated he call week ago requested for a week supply for Metoprolol Tartrate  50 mg and to be sent to [facility]. Patient is out of medication.     [facility] have delayed his Medication.    Patient have an appointment for 1/30/2024.      Please advised

## 2024-01-29 NOTE — TELEPHONE ENCOUNTER
This was sent on 1/22 by Dr. Chan. Pharmacy did magda as received.   Called pt, unable to lvm, phone rang busy, never prompted a voicemail

## 2024-01-29 NOTE — PROGRESS NOTES
Subjective   Patient ID: Stew Rubin is a 78 y.o. male who presents for Medicare Annual Wellness Visit Subsequent, Hypertension, Diabetes, and Hypercholesterolemia.  HPI  Patient presents today for a Medicare AWV, and follow-up on Diabetes, Hypertension, and Hypercholesterolemia. Tries to follow a low sugar, low sodium, and low fat diet. Does not check his sugar or BP at home. Does not exercise. A1C on 1-8-24 was 6.7..     Went for a stress test at [facility], and it threw him into A-Fib. States it was aborted. Has been without his Metoprolol for 22 days due to [employer] delaying on sending it.  Wants to get his medications through his local pharmacy.      Has no other new problem /question.      Medicare wellness questionnaire reviewed in detail.   No problems with activities of daily living. Home safety issues reviewed.   Reminded to have an updated will if needed.    Review of Systems  Constitutional:  no chills, no fever and no night sweats.  Eyes: no blurred vision and no eyesight problems.  ENT: no hearing loss, no nasal congestion, no hoarseness and no sore throat.  Neck: no mass (es) and no swelling.  Cardiovascular: no chest pain, no intermittent leg claudication, no lower extremity edema, no palpitation and no syncope.  Respiratory: no cough, no shortness of breath during exertion, no shortness of breath at rest and no wheezing.  Gastrointestinal: no abdominal pain, no blood in stools, no constipation, no diarrhea, no melena, no nausea, no rectal pain and no vomiting.  Genitourinary: no dysuria, no change in urinary frequency, no urinary hesitancy and no feelings of urinary urgency.  Musculoskeletal: no arthralgias, no back pain and no myalgias.  Integumentary: no new skin lesions and no rashes.  Neurological: no difficulty walking, no headache, no limb weakness, no numbness and no tingling.  Psychiatric/Behavioral: no anxiety, no depression, no anhedonia and no substance use  disorders.  Endocrine: no recent weight gain and no recent weight loss.  Hematologic/Lymphatic: no tendency for easy bruising and no swollen glands    Objective   Physical Exam  Patient in for follow-up and Medicare wellness exam.  He was having trouble getting his metoprolol from his mail away pharmacy we called in a prescription for him on the 22nd he is at the pharmacy locally told he never got it so he is been out of his medication we will get it refilled today he is checking with mail a pharmacy to find out what the hold-up is.  Failed his stress test off the metoprolol developed A-fib walking on the treadmill after 8 minutes he is going to get restarted on his metoprolol and then cardiology is going to review stress test and.  He is status post multiple artery bypass 2012.  Currently denies chest pain or shortness of breath.  Still working on weight loss.  Continuing to have right knee pain as orthopedist is on medical leave he has an appointment with another provider next week he is calling to try to get his x-ray moved up so that will be completed before his appointment.  Due for colonoscopy also no other new complaints.  Lungs are clear cardiac and abdominal exams are benign physical exam today's office visit constitutional alert and oriented x3.    Head is atraumatic HEENT is within normal limits.    Neck supple no masses full range of motion.    Thyroid is normal in size no thyromegaly there is no carotid bruits.    Pulmonary exam shows clear to auscultation no respiratory distress.    Cardiovascular shows no murmur rub or gallop.  Regular rate and rhythm.    Abdominal exam soft nontender no hepatosplenomegaly or masses normal bowel sounds no rebound no guarding.    Musculoskeletal exam no joint pain no muscle pain full range of motion.    Psych exam normal mood and affect.    Dermatologic exam no skin lesions no rash no blemishes.    Neuro exam is no focal deficits.  Normal exam.    Extremities no edema  "normal pulses normal capillary refill.    /60   Pulse 68   Temp 37.2 °C (99 °F)   Resp 12   Ht 1.803 m (5' 11\")   BMI 32.58 kg/m²     Lab Results   Component Value Date    WBC 6.2 02/23/2023    HGB 13.8 02/23/2023    HCT 40.8 (L) 02/23/2023    MCV 99 02/23/2023     02/23/2023       Assessment/Plan plan follow-up after he sees orthopedics and cardiology refill his medication due for blood work after the end of February.  Continues to have reasonably well-controlled diabetes with diet does not want any other medication says he takes too much medicine as it is we will continue to monitor his A1c so far it has been in the 6.66.7 range and holding steady.  Problem List Items Addressed This Visit          Cardiac and Vasculature    Essential hypertension    Relevant Medications    amLODIPine (Norvasc) 5 mg tablet    Hypercholesterolemia    A-fib (CMS/[facility])    Relevant Medications    amLODIPine (Norvasc) 5 mg tablet    metoprolol tartrate (Lopressor) 50 mg tablet       Endocrine/Metabolic    Controlled diabetes mellitus with diabetic neuropathy, without long-term current use of insulin (CMS/[facility])    BMI 32.0-32.9,adult    Class 1 obesity due to excess calories with serious comorbidity and body mass index (BMI) of 32.0 to 32.9 in adult       Genitourinary and Reproductive    Benign prostatic hyperplasia     Other Visit Diagnoses       Medicare annual wellness visit, subsequent    -  Primary    Hypertension, unspecified type        Relevant Medications    spironolactone (Aldactone) 25 mg tablet    metoprolol tartrate (Lopressor) 50 mg tablet    Special screening for malignant neoplasm of colon        Relevant Orders    Colonoscopy Screening; Average Risk Patient    History of colon polyps        Relevant Orders    Colonoscopy Screening; Average Risk Patient    Acute non-recurrent sinusitis, unspecified location        Relevant Medications    amoxicillin (Amoxil) 500 mg capsule            "

## 2024-01-30 ENCOUNTER — OFFICE VISIT (OUTPATIENT)
Dept: PRIMARY CARE | Facility: CLINIC | Age: 79
End: 2024-01-30
Payer: MEDICARE

## 2024-01-30 VITALS
BODY MASS INDEX: 32.58 KG/M2 | HEART RATE: 68 BPM | SYSTOLIC BLOOD PRESSURE: 112 MMHG | HEIGHT: 71 IN | DIASTOLIC BLOOD PRESSURE: 60 MMHG | TEMPERATURE: 99 F | RESPIRATION RATE: 12 BRPM

## 2024-01-30 DIAGNOSIS — Z12.11 SPECIAL SCREENING FOR MALIGNANT NEOPLASM OF COLON: ICD-10-CM

## 2024-01-30 DIAGNOSIS — E11.40 CONTROLLED TYPE 2 DIABETES MELLITUS WITH DIABETIC NEUROPATHY, WITHOUT LONG-TERM CURRENT USE OF INSULIN (MULTI): ICD-10-CM

## 2024-01-30 DIAGNOSIS — N40.0 BENIGN PROSTATIC HYPERPLASIA WITHOUT LOWER URINARY TRACT SYMPTOMS: ICD-10-CM

## 2024-01-30 DIAGNOSIS — I10 ESSENTIAL HYPERTENSION: ICD-10-CM

## 2024-01-30 DIAGNOSIS — Z00.00 MEDICARE ANNUAL WELLNESS VISIT, SUBSEQUENT: Primary | ICD-10-CM

## 2024-01-30 DIAGNOSIS — E66.09 CLASS 1 OBESITY DUE TO EXCESS CALORIES WITH SERIOUS COMORBIDITY AND BODY MASS INDEX (BMI) OF 32.0 TO 32.9 IN ADULT: ICD-10-CM

## 2024-01-30 DIAGNOSIS — I48.91 ATRIAL FIBRILLATION, UNSPECIFIED TYPE (MULTI): ICD-10-CM

## 2024-01-30 DIAGNOSIS — E78.00 HYPERCHOLESTEROLEMIA: ICD-10-CM

## 2024-01-30 DIAGNOSIS — I10 HYPERTENSION, UNSPECIFIED TYPE: ICD-10-CM

## 2024-01-30 DIAGNOSIS — Z86.010 HISTORY OF COLON POLYPS: ICD-10-CM

## 2024-01-30 DIAGNOSIS — J01.90 ACUTE NON-RECURRENT SINUSITIS, UNSPECIFIED LOCATION: ICD-10-CM

## 2024-01-30 PROBLEM — E66.811 CLASS 1 OBESITY DUE TO EXCESS CALORIES WITH SERIOUS COMORBIDITY AND BODY MASS INDEX (BMI) OF 32.0 TO 32.9 IN ADULT: Status: ACTIVE | Noted: 2024-01-30

## 2024-01-30 PROCEDURE — 3078F DIAST BP <80 MM HG: CPT | Performed by: FAMILY MEDICINE

## 2024-01-30 PROCEDURE — G0439 PPPS, SUBSEQ VISIT: HCPCS | Performed by: FAMILY MEDICINE

## 2024-01-30 PROCEDURE — 3074F SYST BP LT 130 MM HG: CPT | Performed by: FAMILY MEDICINE

## 2024-01-30 PROCEDURE — 1159F MED LIST DOCD IN RCRD: CPT | Performed by: FAMILY MEDICINE

## 2024-01-30 PROCEDURE — 1170F FXNL STATUS ASSESSED: CPT | Performed by: FAMILY MEDICINE

## 2024-01-30 PROCEDURE — 1036F TOBACCO NON-USER: CPT | Performed by: FAMILY MEDICINE

## 2024-01-30 RX ORDER — SPIRONOLACTONE 25 MG/1
12.5 TABLET ORAL
Qty: 45 TABLET | Refills: 1 | Status: SHIPPED | OUTPATIENT
Start: 2024-01-30 | End: 2024-05-10 | Stop reason: WASHOUT

## 2024-01-30 RX ORDER — AMLODIPINE BESYLATE 5 MG/1
5 TABLET ORAL DAILY
Qty: 90 TABLET | Refills: 1 | Status: SHIPPED | OUTPATIENT
Start: 2024-01-30 | End: 2024-05-20 | Stop reason: SDUPTHER

## 2024-01-30 RX ORDER — AMOXICILLIN 500 MG/1
500 CAPSULE ORAL EVERY 12 HOURS SCHEDULED
Qty: 60 CAPSULE | Refills: 1 | Status: SHIPPED | OUTPATIENT
Start: 2024-01-30

## 2024-01-30 RX ORDER — METOPROLOL TARTRATE 50 MG/1
50 TABLET ORAL 2 TIMES DAILY
Qty: 30 TABLET | Refills: 1 | Status: SHIPPED | OUTPATIENT
Start: 2024-01-30 | End: 2024-06-10 | Stop reason: SDUPTHER

## 2024-01-30 ASSESSMENT — ACTIVITIES OF DAILY LIVING (ADL)
DOING_HOUSEWORK: INDEPENDENT
DRESSING: INDEPENDENT
TAKING_MEDICATION: INDEPENDENT
GROCERY_SHOPPING: INDEPENDENT
BATHING: INDEPENDENT
MANAGING_FINANCES: INDEPENDENT

## 2024-01-30 ASSESSMENT — PATIENT HEALTH QUESTIONNAIRE - PHQ9
2. FEELING DOWN, DEPRESSED OR HOPELESS: NOT AT ALL
SUM OF ALL RESPONSES TO PHQ9 QUESTIONS 1 AND 2: 0
1. LITTLE INTEREST OR PLEASURE IN DOING THINGS: NOT AT ALL

## 2024-02-05 ENCOUNTER — OFFICE VISIT (OUTPATIENT)
Dept: PRIMARY CARE | Facility: CLINIC | Age: 79
End: 2024-02-05
Payer: MEDICARE

## 2024-02-05 VITALS
TEMPERATURE: 97.2 F | SYSTOLIC BLOOD PRESSURE: 128 MMHG | DIASTOLIC BLOOD PRESSURE: 68 MMHG | RESPIRATION RATE: 20 BRPM | WEIGHT: 236.8 LBS | BODY MASS INDEX: 33.03 KG/M2 | OXYGEN SATURATION: 99 %

## 2024-02-05 DIAGNOSIS — R60.0 LOCALIZED EDEMA: Primary | ICD-10-CM

## 2024-02-05 DIAGNOSIS — F02.80 ALZHEIMER DISEASE (MULTI): ICD-10-CM

## 2024-02-05 DIAGNOSIS — G30.9 ALZHEIMER DISEASE (MULTI): ICD-10-CM

## 2024-02-05 PROCEDURE — 1036F TOBACCO NON-USER: CPT | Performed by: FAMILY MEDICINE

## 2024-02-05 PROCEDURE — 1159F MED LIST DOCD IN RCRD: CPT | Performed by: FAMILY MEDICINE

## 2024-02-05 PROCEDURE — 3078F DIAST BP <80 MM HG: CPT | Performed by: FAMILY MEDICINE

## 2024-02-05 PROCEDURE — 3074F SYST BP LT 130 MM HG: CPT | Performed by: FAMILY MEDICINE

## 2024-02-05 PROCEDURE — 99213 OFFICE O/P EST LOW 20 MIN: CPT | Performed by: FAMILY MEDICINE

## 2024-02-05 RX ORDER — PREDNISONE 10 MG/1
TABLET ORAL
Qty: 51 TABLET | Refills: 0 | Status: SHIPPED | OUTPATIENT
Start: 2024-02-05

## 2024-02-05 RX ORDER — FUROSEMIDE 40 MG/1
40 TABLET ORAL DAILY
Qty: 30 TABLET | Refills: 3 | Status: SHIPPED | OUTPATIENT
Start: 2024-02-05 | End: 2024-05-10 | Stop reason: SDUPTHER

## 2024-02-05 RX ORDER — DONEPEZIL HYDROCHLORIDE 10 MG/1
10 TABLET, FILM COATED ORAL NIGHTLY
Qty: 90 TABLET | Refills: 1 | Status: SHIPPED | OUTPATIENT
Start: 2024-02-05 | End: 2024-06-04 | Stop reason: SDUPTHER

## 2024-02-05 NOTE — PROGRESS NOTES
"Infection  Subjective   Patient ID: Stew Rubin is a 78 y.o. male who presents for Infection and swelling.    HPI  Pt reports above concern  Ongoing x 2 1/2 weeks  Pt reports: Middle finger on RIGHT and, both knees and ankles were swelling  Pt reports \"fluid sac\" on knuckle  Pt was given cream for arthritis in knees by orthopedic. Sttes this made that area better  Pt thinks ankle joint pain maybe gout related    No other concerns    No other concerns today  Review of Systems  Constitutional:  no chills, no fever and no night sweats.  Eyes: no blurred vision and no eyesight problems.  ENT: no hearing loss, no nasal congestion, no hoarseness and no sore throat.  Neck: no mass (es) and no swelling.  Cardiovascular: no chest pain, no intermittent leg claudication, no lower extremity edema, no palpitation and no syncope.  Respiratory: no cough, no shortness of breath during exertion, no shortness of breath at rest and no wheezing.  Gastrointestinal: no abdominal pain, no blood in stools, no constipation, no diarrhea, no melena, no nausea, no rectal pain and no vomiting.  Genitourinary: no dysuria, no change in urinary frequency, no urinary hesitancy and no feelings of urinary urgency.  Musculoskeletal: no arthralgias, no back pain and no myalgias.  Integumentary: no new skin lesions and no rashes.  Neurological: no difficulty walking, no headache, no limb weakness, no numbness and no tingling.  Psychiatric/Behavioral: no anxiety, no depression, no anhedonia and no substance use disorders.  Endocrine: no recent weight gain and no recent weight loss.  Hematologic/Lymphatic: no tendency for easy bruising and no swollen glands    Objective   Physical Exam  Patient in for follow-up joint aches and pains history of gout also right middle finger swelling along the joint line.  Says his right knee aches he was seen by orthopedist x-ray both knees he has had an implant that time all normal..  Well-developed well-nourished male " in no acute distress.  He has evidence of the right third finger PIP joint with a small ganglion cyst at this point is noninfected if it is bothering him more in the future gets larger after send hand specialist for treatment and definitive removal.  He has some achiness in both knees worse on the right side no effusions no warmth he has some mild edema on the right lower ankle both ankles clear but both ankles are little stiff to know.  No other new complaints.  /68   Temp 36.2 °C (97.2 °F)   Resp 20   Wt 107 kg (236 lb 12.8 oz)   SpO2 99%   BMI 33.03 kg/m²     Lab Results   Component Value Date    WBC 6.2 02/23/2023    HGB 13.8 02/23/2023    HCT 40.8 (L) 02/23/2023    MCV 99 02/23/2023     02/23/2023       Assessment/Plan plan is to put on a burst and taper prednisone.  Refill Aricept let us know if he is not improving.  Problem List Items Addressed This Visit    None

## 2024-02-08 ENCOUNTER — TELEPHONE (OUTPATIENT)
Dept: GASTROENTEROLOGY | Facility: CLINIC | Age: 79
End: 2024-02-08
Payer: MEDICARE

## 2024-02-14 DIAGNOSIS — E87.6 HYPOKALEMIA: ICD-10-CM

## 2024-02-14 NOTE — TELEPHONE ENCOUNTER
Patient requesting Potassium refill. It's not on medication list. Should he be taking it? Please advise.

## 2024-02-14 NOTE — TELEPHONE ENCOUNTER
Rx Refill Request Telephone Encounter    Name:  Stew Rubin  : 1945     Medication Name:  Potassium Chloride ER  Dose (Optional):    20 MEQ  Quantity (Optional):    #90  Directions (Optional):   Take 1 tablet daily     ALLERGIES:   nkda     Specific Pharmacy location:  [facility]     Date of last appointment:  24  Date of next appointment:  none     Best number to reach patient:  [phone]

## 2024-02-15 RX ORDER — POTASSIUM CHLORIDE 20 MEQ/1
20 TABLET, EXTENDED RELEASE ORAL DAILY
Qty: 90 TABLET | Refills: 1 | Status: SHIPPED | OUTPATIENT
Start: 2024-02-15 | End: 2024-05-20 | Stop reason: SDUPTHER

## 2024-02-21 ENCOUNTER — CLINICAL SUPPORT (OUTPATIENT)
Dept: PRIMARY CARE | Facility: CLINIC | Age: 79
End: 2024-02-21
Payer: MEDICARE

## 2024-02-21 DIAGNOSIS — E29.1 TESTICULAR HYPOFUNCTION: ICD-10-CM

## 2024-02-21 PROCEDURE — 96372 THER/PROPH/DIAG INJ SC/IM: CPT | Performed by: FAMILY MEDICINE

## 2024-02-21 RX ADMIN — TESTOSTERONE CYPIONATE 200 MG: 200 INJECTION, SOLUTION INTRAMUSCULAR at 09:23

## 2024-02-21 NOTE — PROGRESS NOTES
Patient is here for testosterone injection.  Doing well. No complaints.  200 mg injection in Left Gluteal   Patient tolerated injection well.  Return as instructed in a month.    Labs: 01/08/2024  CSA: 02/21/2024

## 2024-02-29 DIAGNOSIS — Z12.11 COLON CANCER SCREENING: ICD-10-CM

## 2024-03-01 ENCOUNTER — TELEPHONE (OUTPATIENT)
Dept: PRIMARY CARE | Facility: CLINIC | Age: 79
End: 2024-03-01
Payer: MEDICARE

## 2024-03-01 DIAGNOSIS — S06.0XAA CONCUSSION WITH UNKNOWN LOSS OF CONSCIOUSNESS STATUS, INITIAL ENCOUNTER: Primary | ICD-10-CM

## 2024-03-01 RX ORDER — OXYCODONE AND ACETAMINOPHEN 5; 325 MG/1; MG/1
1 TABLET ORAL EVERY 6 HOURS PRN
Qty: 16 TABLET | Refills: 0 | Status: SHIPPED | OUTPATIENT
Start: 2024-03-01 | End: 2024-03-05

## 2024-03-01 NOTE — TELEPHONE ENCOUNTER
Patient is calling because he was at [facility] on 2/24/24 with a severe concussion. They gave him 8 tabs of Oxycodone 5-325mg to take 1 every 6 hours. He states they told him at the hospital to call his PCP to get another 8 tabs of the Oxycodone. He wanted to know if you could call that in for him. He can get you updated on what happened if you would like to return his call    Ok for rx?  Call patient back?    Preferred pharmacy [facility]      Records were also requested from [facility]     Patient's # [phone]

## 2024-03-01 NOTE — TELEPHONE ENCOUNTER
Patient aware written rx is ready to  and he will have his wife pick it up here at the office. Aware to have her bring her ID

## 2024-03-05 DIAGNOSIS — E78.2 MIXED HYPERLIPIDEMIA: ICD-10-CM

## 2024-03-05 RX ORDER — PRAVASTATIN SODIUM 20 MG/1
20 TABLET ORAL NIGHTLY
Qty: 90 TABLET | Refills: 1 | Status: SHIPPED | OUTPATIENT
Start: 2024-03-05 | End: 2024-06-10 | Stop reason: SDUPTHER

## 2024-03-05 NOTE — TELEPHONE ENCOUNTER
Rx Refill Request Telephone Encounter    Name:  Stew Rubin  : 1945     Medication Name:  pravastatin (Pravachol) 20 mg   Quantity (Optional):    90 refill: 1  Directions (Optional):   Take 1 tablet (20 mg) by mouth once daily at bedtime.     Specific Pharmacy location:  [facility]     Date of last appointment:  24

## 2024-03-21 RX ORDER — POLYETHYLENE GLYCOL 3350, SODIUM CHLORIDE, SODIUM BICARBONATE, POTASSIUM CHLORIDE 420; 11.2; 5.72; 1.48 G/4L; G/4L; G/4L; G/4L
4000 POWDER, FOR SOLUTION ORAL ONCE
Qty: 4000 ML | Refills: 0 | Status: SHIPPED | OUTPATIENT
Start: 2024-03-21 | End: 2024-03-21

## 2024-03-26 ENCOUNTER — APPOINTMENT (OUTPATIENT)
Dept: GASTROENTEROLOGY | Facility: HOSPITAL | Age: 79
End: 2024-03-26
Payer: MEDICARE

## 2024-03-29 ENCOUNTER — LAB (OUTPATIENT)
Dept: LAB | Facility: LAB | Age: 79
End: 2024-03-29
Payer: MEDICARE

## 2024-03-29 DIAGNOSIS — E29.1 TESTICULAR HYPOFUNCTION: ICD-10-CM

## 2024-03-29 DIAGNOSIS — E11.40: ICD-10-CM

## 2024-03-29 DIAGNOSIS — E11.40 CONTROLLED TYPE 2 DIABETES MELLITUS WITH DIABETIC NEUROPATHY, WITHOUT LONG-TERM CURRENT USE OF INSULIN (MULTI): ICD-10-CM

## 2024-03-29 LAB
EST. AVERAGE GLUCOSE BLD GHB EST-MCNC: 163 MG/DL
HBA1C MFR BLD: 7.3 %
TESTOST SERPL-MCNC: 206 NG/DL (ref 240–1000)

## 2024-03-29 PROCEDURE — 84403 ASSAY OF TOTAL TESTOSTERONE: CPT

## 2024-03-29 PROCEDURE — 83036 HEMOGLOBIN GLYCOSYLATED A1C: CPT

## 2024-03-29 PROCEDURE — 36415 COLL VENOUS BLD VENIPUNCTURE: CPT

## 2024-03-29 NOTE — TELEPHONE ENCOUNTER
----- Message from Garrett Ha MD sent at 3/29/2024  4:18 PM EDT -----  Testosterone level is low at 206.  Hemoglobin A1c is worsened.  Now 7.3.  Recommend starting metformin 500 mg twice a day and repeat an A1c in 3 months.

## 2024-03-29 NOTE — TELEPHONE ENCOUNTER
Patient called back. Aware of results. He would like the metformin.     Patient says he's been bed ridden for about 7 weeks now. He claims he had a concussion and a broken neck. (I wasn't sure if you knew this). He claims no sugary foods. No alcohol for 8 weeks. He lost about 23 pounds in 7 weeks

## 2024-04-01 ENCOUNTER — TELEPHONE (OUTPATIENT)
Dept: PRIMARY CARE | Facility: CLINIC | Age: 79
End: 2024-04-01
Payer: MEDICARE

## 2024-04-01 RX ORDER — METFORMIN HYDROCHLORIDE 500 MG/1
500 TABLET ORAL
Qty: 60 TABLET | Refills: 2 | Status: SHIPPED | OUTPATIENT
Start: 2024-04-01 | End: 2024-05-20 | Stop reason: SDUPTHER

## 2024-04-01 NOTE — TELEPHONE ENCOUNTER
----- Message from Garrett Ha MD sent at 4/1/2024  7:54 AM EDT -----  Testosterone level is low.  Is he still taking testosterone?  If so 1 was the last time he took a shot

## 2024-04-01 NOTE — TELEPHONE ENCOUNTER
Garrett Ha MD  Do [id number] [place] [id number] Clinical Support Staff6 hours ago (8:27 AM)       Metformin prescription was sent in recheck an A1c in 4 months do not worry about the testosterone level for now

## 2024-04-01 NOTE — TELEPHONE ENCOUNTER
Tried to call patient. He could not hear me. Tried calling back twice, and rings busy.       Garrett Ha MD  Do [id number] [place] [id number] Clinical Support Staff6 hours ago (8:27 AM)       Metformin prescription was sent in recheck an A1c in 4 months do not worry about the testosterone level for now

## 2024-04-02 NOTE — TELEPHONE ENCOUNTER
Called patient, he is aware.  He states he will set up an appointment to discuss further with Dr. Ha next week.  A1C ordered.

## 2024-04-22 ENCOUNTER — OFFICE VISIT (OUTPATIENT)
Dept: PRIMARY CARE | Facility: CLINIC | Age: 79
End: 2024-04-22
Payer: MEDICARE

## 2024-04-22 VITALS
DIASTOLIC BLOOD PRESSURE: 80 MMHG | SYSTOLIC BLOOD PRESSURE: 120 MMHG | HEIGHT: 72 IN | BODY MASS INDEX: 29.74 KG/M2 | HEART RATE: 57 BPM | OXYGEN SATURATION: 99 % | RESPIRATION RATE: 18 BRPM | WEIGHT: 219.6 LBS

## 2024-04-22 DIAGNOSIS — S06.0X0A CONCUSSION WITHOUT LOSS OF CONSCIOUSNESS, INITIAL ENCOUNTER: ICD-10-CM

## 2024-04-22 DIAGNOSIS — R41.3 MEMORY LOSS: ICD-10-CM

## 2024-04-22 DIAGNOSIS — E29.1 TESTICULAR HYPOFUNCTION: ICD-10-CM

## 2024-04-22 DIAGNOSIS — I10 HYPERTENSION, UNSPECIFIED TYPE: ICD-10-CM

## 2024-04-22 DIAGNOSIS — E11.40 CONTROLLED TYPE 2 DIABETES MELLITUS WITH DIABETIC NEUROPATHY, WITHOUT LONG-TERM CURRENT USE OF INSULIN (MULTI): Primary | ICD-10-CM

## 2024-04-22 DIAGNOSIS — B35.1 NAIL FUNGUS: ICD-10-CM

## 2024-04-22 DIAGNOSIS — E78.00 HYPERCHOLESTEROLEMIA: ICD-10-CM

## 2024-04-22 DIAGNOSIS — G62.9 NEUROPATHY: ICD-10-CM

## 2024-04-22 DIAGNOSIS — E78.2 MIXED HYPERLIPIDEMIA: ICD-10-CM

## 2024-04-22 DIAGNOSIS — I73.9 PERIPHERAL ARTERY DISEASE (CMS-HCC): ICD-10-CM

## 2024-04-22 DIAGNOSIS — I10 ESSENTIAL HYPERTENSION: ICD-10-CM

## 2024-04-22 PROCEDURE — 3074F SYST BP LT 130 MM HG: CPT | Performed by: FAMILY MEDICINE

## 2024-04-22 PROCEDURE — 3079F DIAST BP 80-89 MM HG: CPT | Performed by: FAMILY MEDICINE

## 2024-04-22 PROCEDURE — 1159F MED LIST DOCD IN RCRD: CPT | Performed by: FAMILY MEDICINE

## 2024-04-22 PROCEDURE — 99213 OFFICE O/P EST LOW 20 MIN: CPT | Performed by: FAMILY MEDICINE

## 2024-04-22 RX ORDER — MEMANTINE HYDROCHLORIDE 21 MG/1
21 CAPSULE, EXTENDED RELEASE ORAL DAILY
Qty: 90 CAPSULE | Refills: 1 | Status: SHIPPED | OUTPATIENT
Start: 2024-04-22

## 2024-04-22 RX ORDER — OXYCODONE AND ACETAMINOPHEN 5; 325 MG/1; MG/1
1 TABLET ORAL EVERY 8 HOURS PRN
Qty: 21 TABLET | Refills: 0 | Status: SHIPPED | OUTPATIENT
Start: 2024-04-22 | End: 2024-04-29

## 2024-04-22 RX ORDER — TERBINAFINE HYDROCHLORIDE 250 MG/1
250 TABLET ORAL DAILY
Qty: 90 TABLET | Refills: 0 | Status: SHIPPED | OUTPATIENT
Start: 2024-04-22 | End: 2024-07-21

## 2024-04-22 RX ORDER — INDAPAMIDE 1.25 MG/1
1.25 TABLET ORAL
Qty: 90 TABLET | Refills: 1 | Status: SHIPPED | OUTPATIENT
Start: 2024-04-22 | End: 2024-05-10 | Stop reason: WASHOUT

## 2024-04-22 RX ORDER — EZETIMIBE 10 MG/1
TABLET ORAL
Qty: 90 TABLET | Refills: 1 | Status: SHIPPED | OUTPATIENT
Start: 2024-04-22

## 2024-04-22 RX ORDER — GABAPENTIN 300 MG/1
300 CAPSULE ORAL DAILY
Qty: 90 CAPSULE | Refills: 1 | Status: SHIPPED | OUTPATIENT
Start: 2024-04-22

## 2024-04-22 ASSESSMENT — PATIENT HEALTH QUESTIONNAIRE - PHQ9
1. LITTLE INTEREST OR PLEASURE IN DOING THINGS: NOT AT ALL
2. FEELING DOWN, DEPRESSED OR HOPELESS: NOT AT ALL
SUM OF ALL RESPONSES TO PHQ9 QUESTIONS 1 AND 2: 0

## 2024-04-22 ASSESSMENT — ENCOUNTER SYMPTOMS
DEPRESSION: 0
LOSS OF SENSATION IN FEET: 0
OCCASIONAL FEELINGS OF UNSTEADINESS: 0

## 2024-04-22 NOTE — PROGRESS NOTES
Subjective   Patient ID: Stew Rubin is a 79 y.o. male who presents for Hyperlipidemia and Hypertension.  HPI    HTN follow up  Denies chest pain,SOB, swelling, headaches, lightheadedness or dizziness.   Eats a generally healthy diet, Exercises.   Does check BP at home.   Currently taking metoprolol, Zetia, amlodipine    Has no other new problem /question.    Review of Systems  Constitutional:  no chills, no fever and no night sweats.  Eyes: no blurred vision and no eyesight problems.  ENT: no hearing loss, no nasal congestion, no hoarseness and no sore throat.  Neck: no mass (es) and no swelling.  Cardiovascular: no chest pain, no intermittent leg claudication, no lower extremity edema, no palpitation and no syncope.  Respiratory: no cough, no shortness of breath during exertion, no shortness of breath at rest and no wheezing.  Gastrointestinal: no abdominal pain, no blood in stools, no constipation, no diarrhea, no melena, no nausea, no rectal pain and no vomiting.  Genitourinary: no dysuria, no change in urinary frequency, no urinary hesitancy and no feelings of urinary urgency.  Musculoskeletal: no arthralgias, no back pain and no myalgias.  Integumentary: no new skin lesions and no rashes.  Neurological: no difficulty walking, no headache, no limb weakness, no numbness and no tingling.  Psychiatric/Behavioral: no anxiety, no depression, no anhedonia and no substance use disorders.  Endocrine: no recent weight gain and no recent weight loss.  Hematologic/Lymphatic: no tendency for easy bruising and no swollen glands    Objective   Physical Exam  Patient in for follow-up on needs chronic medication refilled end of February got out of bed and slipped on a rug went headfirst onto the wall ended up with cervical fracture he is in neck collar no nerve damage has had x-rays in March which showed normal early healing he follows up with neurosurgery in 2 weeks.  Denies any paresthesias into the arms wearing his neck  collar as directed.  Also worsening nail fungus topical over-the-counter medication not helping.  He would like a refill on his oxycodone he is taking half of 5 mg once a day or twice a day occasionally for the persistent headache post concussive syndrome.  /80   Pulse 57   Resp 18   Ht 1.829 m (6')   Wt 99.6 kg (219 lb 9.6 oz)   SpO2 99%   BMI 29.78 kg/m²     Lab Results   Component Value Date    WBC 6.2 02/23/2023    HGB 13.8 02/23/2023    HCT 40.8 (L) 02/23/2023    MCV 99 02/23/2023     02/23/2023       Assessment/Plan plan is status post fall blunt trauma with cervical fracture healing may need surgery for stabilization this is to be decided at his next visit in 2 weeks with neurosurgery we will start him on Lamisil 250 once a day for the nail fungus where he needs to take this for 3 months and then will have to wait for new nail to grow out.  I will follow-up with him again in 4 months or as needed  Problem List Items Addressed This Visit          Cardiac and Vasculature    Essential hypertension    Hypercholesterolemia    Mixed hyperlipidemia    Relevant Medications    ezetimibe (Zetia) 10 mg tablet    Peripheral artery disease (CMS-HCC)       Endocrine/Metabolic    Controlled diabetes mellitus with diabetic neuropathy, without long-term current use of insulin (Multi) - Primary    Testicular hypofunction       Neuro    Memory loss    Relevant Medications    memantine (Namenda) 21 mg capsule,sprinkle,ER 24hr    Neuropathy    Relevant Medications    gabapentin (Neurontin) 300 mg capsule     Other Visit Diagnoses       Hypertension, unspecified type        Relevant Medications    indapamide (Lozol) 1.25 mg tablet    Concussion without loss of consciousness, initial encounter        Relevant Medications    terbinafine (LamISIL) 250 mg tablet    oxyCODONE-acetaminophen (Percocet) 5-325 mg tablet    Nail fungus

## 2024-05-06 ENCOUNTER — OFFICE VISIT (OUTPATIENT)
Dept: GASTROENTEROLOGY | Facility: CLINIC | Age: 79
End: 2024-05-06
Payer: MEDICARE

## 2024-05-06 VITALS — RESPIRATION RATE: 18 BRPM | BODY MASS INDEX: 29.66 KG/M2 | HEIGHT: 72 IN | WEIGHT: 219 LBS

## 2024-05-06 DIAGNOSIS — Z86.010 HISTORY OF COLON POLYPS: ICD-10-CM

## 2024-05-06 DIAGNOSIS — R19.4 ALTERED BOWEL HABITS: Primary | ICD-10-CM

## 2024-05-06 DIAGNOSIS — Z12.11 ENCOUNTER FOR SCREENING FOR MALIGNANT NEOPLASM OF COLON: ICD-10-CM

## 2024-05-06 PROCEDURE — 1159F MED LIST DOCD IN RCRD: CPT | Performed by: STUDENT IN AN ORGANIZED HEALTH CARE EDUCATION/TRAINING PROGRAM

## 2024-05-06 PROCEDURE — 99203 OFFICE O/P NEW LOW 30 MIN: CPT | Performed by: STUDENT IN AN ORGANIZED HEALTH CARE EDUCATION/TRAINING PROGRAM

## 2024-05-06 PROCEDURE — 1036F TOBACCO NON-USER: CPT | Performed by: STUDENT IN AN ORGANIZED HEALTH CARE EDUCATION/TRAINING PROGRAM

## 2024-05-06 NOTE — PROGRESS NOTES
CC: History of polyps + altered bowel habits.    History of Present Illness:   Stew Rubin is a 79 y.o. male with a PMH of HTN, HLD, CAD s/p CABG, PAD, atrial fibrillation, DMII, obesity, BPH, prostate cancer, MARICEL, headaches, gout who presents to clinic for history of polyps + altered bowel habits.  Patient recently fractured his C-spine is unable to undergo colonoscopy at this time.  He does complain of altered bowel habits.  He had significant constipation where he needed to self disimpact himself a couple weeks ago.  He is not taking anything to help his bowels.  He also states that he will have diarrhea and normal stools at times.  He is on small dose oxycodone for his neck.  No other concerns at this time.      Colonoscopy 2018: 3 polyps, diverticulosis, hemorrhoids.     Review of Systems  ROS Negative unless otherwise stated above.    Past Medical/Surgical History  Past Medical History:   Diagnosis Date    Body mass index (BMI) 34.0-34.9, adult 07/12/2022    BMI 34.0-34.9,adult    Chronic rhinitis 12/06/2019    Rhinitis, nonallergic, chronic    Encounter for general adult medical examination without abnormal findings 03/23/2021    Encounter for Medicare annual wellness exam    Encounter for immunization     Need for shingles vaccine    Gout, unspecified 01/19/2022    Gout    Headache, unspecified 08/24/2019    Chronic nonintractable headache, unspecified headache type    Localized swelling, mass and lump, unspecified 12/04/2020    Lump of skin    Obesity, unspecified 07/12/2022    Class 1 obesity with serious comorbidity and body mass index (BMI) of 34.0 to 34.9 in adult, unspecified obesity type    Other abnormal glucose     Elevated glucose    Other skin changes     Skin irritation    Personal history of other diseases of the nervous system and sense organs 12/06/2019    History of otitis media    Personal history of other diseases of the respiratory system 08/09/2021    History of paranasal sinus  congestion    Personal history of other drug therapy     History of pneumococcal vaccination    Personal history of other endocrine, nutritional and metabolic disease 12/11/2019    History of dehydration    Personal history of other infectious and parasitic diseases     H/O staphylococcal septicemia    Personal history of other specified conditions     History of chest pain    Polyneuropathy, unspecified 12/05/2019    Neuropathy    Pure hypercholesterolemia, unspecified 07/12/2022    Hypercholesterolemia    Stiffness of right knee, not elsewhere classified 02/21/2020    Stiffness of right knee, not elsewhere classified      Past Surgical History:   Procedure Laterality Date    CT AORTA AND BILATERAL ILIOFEMORAL RUNOFF ANGIOGRAM W AND/OR WO IV CONTRAST  5/5/2020    CT AORTA AND BILATERAL ILIOFEMORAL RUNOFF ANGIOGRAM W AND/OR WO IV CONTRAST 5/5/2020 [facility]    OTHER SURGICAL HISTORY  11/17/2021    Abdominal surgery    OTHER SURGICAL HISTORY  11/17/2021    Back surgery    OTHER SURGICAL HISTORY  11/17/2021    Carpal tunnel surgery    OTHER SURGICAL HISTORY  11/17/2021    Cardiac catheterization with stent placement    OTHER SURGICAL HISTORY  11/17/2021    Colonoscopy    OTHER SURGICAL HISTORY  11/17/2021    Shoulder surgery    OTHER SURGICAL HISTORY  11/17/2021    Prostate surgery    OTHER SURGICAL HISTORY  11/17/2021    Phacoemulsification of cataract and insertion of intraocular lens    OTHER SURGICAL HISTORY  11/17/2021    Knee surgery    OTHER SURGICAL HISTORY  11/17/2021    Coronary artery bypass graft    OTHER SURGICAL HISTORY  11/17/2021    Retinal detachment repair    OTHER SURGICAL HISTORY  11/02/2020    Angioplasty        Social History   reports that he has never smoked. He has never used smokeless tobacco. He reports current alcohol use. He reports that he does not use drugs.     Family History  Family history is unknown by patient.     Allergies  No Known Allergies    Medications  Current  Outpatient Medications   Medication Instructions    amLODIPine (NORVASC) 5 mg, oral, Daily    amoxicillin (AMOXIL) 500 mg, oral, Every 12 hours scheduled    chlorthalidone (Thalitone) 15 mg tablet PLEASE MAKE CHANGES TO INCLUDE DRUG NAME/STR/DIRECTIONS/QTY/REFILLS BASED ON ALTERNATE PRESCRIBED MEDICATION    donepezil (ARICEPT) 10 mg, oral, Nightly    ezetimibe (Zetia) 10 mg tablet Take 1 Daily    furosemide (LASIX) 40 mg, oral, Daily    gabapentin (NEURONTIN) 300 mg, oral, Daily, PRN    indapamide (LOZOL) 1.25 mg, oral, Daily RT    memantine (NAMENDA) 21 mg, oral, Daily    metFORMIN (GLUCOPHAGE) 500 mg, oral, 2 times daily with meals    metoprolol tartrate (LOPRESSOR) 50 mg, oral, 2 times daily    metoprolol tartrate (LOPRESSOR) 50 mg, oral, 2 times daily    potassium chloride CR (Klor-Con M20) 20 mEq ER tablet 20 mEq, oral, Daily, Do not crush or chew.    pravastatin (PRAVACHOL) 20 mg, oral, Nightly    predniSONE (Deltasone) 10 mg tablet Take 60 mg a day for 6 days.  Then decrease by 10 mg a day until gone    spironolactone (ALDACTONE) 12.5 mg, oral, Daily RT    terbinafine (LAMISIL) 250 mg, oral, Daily        Objective   Visit Vitals  Resp 18        General: A&Ox3, NAD.  HEENT: Neck brace.   Skin: No Jaundice.   Neuro: No focal deficits.   Psych: Normal mood and affect.     Lab Results   Component Value Date    WBC 6.2 02/23/2023    HGB 13.8 02/23/2023    HCT 40.8 (L) 02/23/2023    MCV 99 02/23/2023     02/23/2023       Chemistry    Lab Results   Component Value Date/Time     01/08/2024 0853    K 4.5 01/08/2024 0853     01/08/2024 0853    CO2 29 01/08/2024 0853    BUN 25 (H) 01/08/2024 0853    CREATININE 1.23 01/08/2024 0853    Lab Results   Component Value Date/Time    CALCIUM 9.7 01/08/2024 0853    ALKPHOS 67 02/23/2023 0921    AST 23 02/23/2023 0921    ALT 22 02/23/2023 0921    BILITOT 0.6 02/23/2023 0921             ASSESSMENT/PLAN  Stew FREEDMAN Scottie is a 79 y.o. male with a PMH of HTN, HLD, CAD  s/p CABG, PAD, atrial fibrillation, DMII, obesity, BPH, prostate cancer, MARICEL, headaches, gout who presents to clinic for history of polyps + altered bowel habits.  Unfortunately, we are not able to proceed with colonoscopy at this time given his recent C-spine fracture.  We will await healing and require clearance at the appropriate time.  Patient should start Metamucil + MiraLAX with titration of both as needed.  Patient to follow-up and let me know when he is cleared for his colonoscopy.      Grant Calle, DO

## 2024-05-09 NOTE — PROGRESS NOTES
Subjective   Patient ID: Stew Rubin is a 79 y.o. male who presents for Joint Swelling.  HPI    Patient presents in the office today with complaints of bilateral ankle swelling. Patient states he had stents placed in his legs by Dr. Gupta and he advised patient to sleep on his back with feet elevated. Patient states he is now waking up with his ankles swollen and they progressively get worse as the day goes on. Has tried elevated legs. Ongoing x 3 weeks. Denies shortness of breath.     10 weeks ago patient had a fall in his bedroom and fractured vertebrae in his neck and also a concussion. Patient states he spent 6 days in the trauma center at [facility]. Patient states his headaches are less frequent and has memory loss.     Review of Systems  Constitutional:  no chills, no fever and no night sweats.  Eyes: no blurred vision and no eyesight problems.  ENT: no hearing loss, no nasal congestion, no hoarseness and no sore throat.  Neck: no mass (es) and no swelling.  Cardiovascular: no chest pain, no intermittent leg claudication, no lower extremity edema, no palpitation and no syncope.  Respiratory: no cough, no shortness of breath during exertion, no shortness of breath at rest and no wheezing.  Gastrointestinal: no abdominal pain, no blood in stools, no constipation, no diarrhea, no melena, no nausea, no rectal pain and no vomiting.  Genitourinary: no dysuria, no change in urinary frequency, no urinary hesitancy and no feelings of urinary urgency.  Musculoskeletal: no arthralgias, no back pain and no myalgias.  Integumentary: no new skin lesions and no rashes.  Neurological: no difficulty walking, no headache, no limb weakness, no numbness and no tingling.  Psychiatric/Behavioral: no anxiety, no depression, no anhedonia and no substance use disorders.  Endocrine: no recent weight gain and no recent weight loss.  Hematologic/Lymphatic: no tendency for easy bruising and no swollen glands    Objective    Physical Exam  Patient in follow-up 3 weeks ago develops sudden onset bilateral lower leg edema and he is currently on low doses of 3 different diuretics none of which is related to therapeutic level he is not sure who started him all of these.  He does take a potassium supplement daily he has pitting edema to below the knees bilaterally worse on the right side history of venous insufficiency and arterial insufficiency stenting on the left leg 5 or 6 years ago.  He has good capillary refill there is no pain in the legs.  /82 (BP Location: Left arm, Patient Position: Sitting)   Pulse 83   Temp 36.9 °C (98.4 °F) (Temporal)   Ht 1.829 m (6')   Wt 111 kg (245 lb)   SpO2 97%   BMI 33.23 kg/m²     Lab Results   Component Value Date    WBC 6.2 02/23/2023    HGB 13.8 02/23/2023    HCT 40.8 (L) 02/23/2023    MCV 99 02/23/2023     02/23/2023       Assessment/Plan assessment new onset peripheral edema questionable etiology plan is to start him on Lasix 40 twice daily for the next 3 days then drop it down to daily and have him follow-up with us next week.  He has no shortness of breath his lungs are clear.  We also reviewed advanced care planning.  Problem List Items Addressed This Visit          Cardiac and Vasculature    Essential hypertension    Hypercholesterolemia    Mixed hyperlipidemia    Peripheral artery disease (CMS-HCC) - Primary       Genitourinary and Reproductive    Benign prostatic hyperplasia     Other Visit Diagnoses       BMI 33.0-33.9,adult        Class 1 obesity due to excess calories with serious comorbidity and body mass index (BMI) of 33.0 to 33.9 in adult        Localized edema        Relevant Medications    furosemide (Lasix) 40 mg tablet

## 2024-05-10 ENCOUNTER — OFFICE VISIT (OUTPATIENT)
Dept: PRIMARY CARE | Facility: CLINIC | Age: 79
End: 2024-05-10
Payer: MEDICARE

## 2024-05-10 VITALS
DIASTOLIC BLOOD PRESSURE: 82 MMHG | HEART RATE: 83 BPM | HEIGHT: 72 IN | WEIGHT: 245 LBS | TEMPERATURE: 98.4 F | SYSTOLIC BLOOD PRESSURE: 126 MMHG | OXYGEN SATURATION: 97 % | BODY MASS INDEX: 33.18 KG/M2

## 2024-05-10 DIAGNOSIS — N40.0 BENIGN PROSTATIC HYPERPLASIA, UNSPECIFIED WHETHER LOWER URINARY TRACT SYMPTOMS PRESENT: ICD-10-CM

## 2024-05-10 DIAGNOSIS — E78.2 MIXED HYPERLIPIDEMIA: ICD-10-CM

## 2024-05-10 DIAGNOSIS — I10 ESSENTIAL HYPERTENSION: ICD-10-CM

## 2024-05-10 DIAGNOSIS — E78.00 HYPERCHOLESTEROLEMIA: ICD-10-CM

## 2024-05-10 DIAGNOSIS — R60.0 LOCALIZED EDEMA: ICD-10-CM

## 2024-05-10 DIAGNOSIS — I73.9 PERIPHERAL ARTERY DISEASE (CMS-HCC): Primary | ICD-10-CM

## 2024-05-10 DIAGNOSIS — E66.09 CLASS 1 OBESITY DUE TO EXCESS CALORIES WITH SERIOUS COMORBIDITY AND BODY MASS INDEX (BMI) OF 33.0 TO 33.9 IN ADULT: ICD-10-CM

## 2024-05-10 PROCEDURE — 1158F ADVNC CARE PLAN TLK DOCD: CPT | Performed by: FAMILY MEDICINE

## 2024-05-10 PROCEDURE — 1123F ACP DISCUSS/DSCN MKR DOCD: CPT | Performed by: FAMILY MEDICINE

## 2024-05-10 PROCEDURE — 1036F TOBACCO NON-USER: CPT | Performed by: FAMILY MEDICINE

## 2024-05-10 PROCEDURE — 3074F SYST BP LT 130 MM HG: CPT | Performed by: FAMILY MEDICINE

## 2024-05-10 PROCEDURE — 1159F MED LIST DOCD IN RCRD: CPT | Performed by: FAMILY MEDICINE

## 2024-05-10 PROCEDURE — 3079F DIAST BP 80-89 MM HG: CPT | Performed by: FAMILY MEDICINE

## 2024-05-10 PROCEDURE — 99213 OFFICE O/P EST LOW 20 MIN: CPT | Performed by: FAMILY MEDICINE

## 2024-05-10 RX ORDER — FUROSEMIDE 40 MG/1
40 TABLET ORAL DAILY
Qty: 30 TABLET | Refills: 3 | Status: SHIPPED | OUTPATIENT
Start: 2024-05-10 | End: 2024-05-20 | Stop reason: SDUPTHER

## 2024-05-10 ASSESSMENT — PATIENT HEALTH QUESTIONNAIRE - PHQ9
SUM OF ALL RESPONSES TO PHQ9 QUESTIONS 1 AND 2: 0
2. FEELING DOWN, DEPRESSED OR HOPELESS: NOT AT ALL
1. LITTLE INTEREST OR PLEASURE IN DOING THINGS: NOT AT ALL

## 2024-05-15 ENCOUNTER — TELEPHONE (OUTPATIENT)
Dept: PRIMARY CARE | Facility: CLINIC | Age: 79
End: 2024-05-15

## 2024-05-15 ENCOUNTER — HOSPITAL ENCOUNTER (OUTPATIENT)
Dept: RADIOLOGY | Facility: CLINIC | Age: 79
Discharge: HOME | End: 2024-05-15
Payer: MEDICARE

## 2024-05-15 ENCOUNTER — TELEPHONE (OUTPATIENT)
Dept: PRIMARY CARE | Facility: CLINIC | Age: 79
End: 2024-05-15
Payer: MEDICARE

## 2024-05-15 DIAGNOSIS — M25.571 RIGHT ANKLE PAIN: ICD-10-CM

## 2024-05-15 DIAGNOSIS — M79.671 RIGHT FOOT PAIN: ICD-10-CM

## 2024-05-15 PROCEDURE — 73620 X-RAY EXAM OF FOOT: CPT | Mod: RT

## 2024-05-15 PROCEDURE — 73600 X-RAY EXAM OF ANKLE: CPT | Mod: RIGHT SIDE | Performed by: RADIOLOGY

## 2024-05-15 PROCEDURE — 73600 X-RAY EXAM OF ANKLE: CPT | Mod: RT

## 2024-05-15 PROCEDURE — 73620 X-RAY EXAM OF FOOT: CPT | Mod: RIGHT SIDE | Performed by: RADIOLOGY

## 2024-05-15 NOTE — TELEPHONE ENCOUNTER
Patient calling requesting xrays on right foot and ankle, because he thinks he may have a stress fracture or broken bone.  Reports that there is pain in the right foot on the outside/edge.     Ok per Dr. Ha for xrays.    Patient aware that xrays were ordered.

## 2024-05-15 NOTE — TELEPHONE ENCOUNTER
PATIENT IS REQUESTING PHONE CALL FROM YOU. STATES THAT YOU WANTED HIM TO CALL AND GIVE YOU AN UPDATE FROM HIS VISIT ON FRIDAY.    I ASKED PATIENT WHAT THE UPDATE WAS AND HE STATED THAT DR. LUCAS JUST NEEDS TO GIVE HIM A CALL AND HE WILL SPEAK TO HIM ABOUT IT.    PLEASE ADVISE.

## 2024-05-20 ENCOUNTER — OFFICE VISIT (OUTPATIENT)
Dept: PRIMARY CARE | Facility: CLINIC | Age: 79
End: 2024-05-20
Payer: MEDICARE

## 2024-05-20 VITALS
WEIGHT: 226.8 LBS | HEART RATE: 63 BPM | OXYGEN SATURATION: 94 % | HEIGHT: 72 IN | DIASTOLIC BLOOD PRESSURE: 76 MMHG | SYSTOLIC BLOOD PRESSURE: 130 MMHG | TEMPERATURE: 96.7 F | BODY MASS INDEX: 30.72 KG/M2

## 2024-05-20 DIAGNOSIS — M10.00 IDIOPATHIC GOUT, UNSPECIFIED CHRONICITY, UNSPECIFIED SITE: ICD-10-CM

## 2024-05-20 DIAGNOSIS — M25.471 RIGHT ANKLE SWELLING: Primary | ICD-10-CM

## 2024-05-20 DIAGNOSIS — I10 ESSENTIAL HYPERTENSION: ICD-10-CM

## 2024-05-20 DIAGNOSIS — E66.09 CLASS 1 OBESITY DUE TO EXCESS CALORIES WITH SERIOUS COMORBIDITY AND BODY MASS INDEX (BMI) OF 30.0 TO 30.9 IN ADULT: ICD-10-CM

## 2024-05-20 DIAGNOSIS — E87.6 HYPOKALEMIA: ICD-10-CM

## 2024-05-20 DIAGNOSIS — R60.0 LOCALIZED EDEMA: ICD-10-CM

## 2024-05-20 DIAGNOSIS — E11.40 CONTROLLED TYPE 2 DIABETES MELLITUS WITH DIABETIC NEUROPATHY, WITHOUT LONG-TERM CURRENT USE OF INSULIN (MULTI): ICD-10-CM

## 2024-05-20 PROCEDURE — 99213 OFFICE O/P EST LOW 20 MIN: CPT | Performed by: FAMILY MEDICINE

## 2024-05-20 PROCEDURE — 3075F SYST BP GE 130 - 139MM HG: CPT | Performed by: FAMILY MEDICINE

## 2024-05-20 PROCEDURE — 3078F DIAST BP <80 MM HG: CPT | Performed by: FAMILY MEDICINE

## 2024-05-20 PROCEDURE — 1159F MED LIST DOCD IN RCRD: CPT | Performed by: FAMILY MEDICINE

## 2024-05-20 RX ORDER — PREDNISONE 10 MG/1
TABLET ORAL
Qty: 51 TABLET | Refills: 0 | Status: SHIPPED | OUTPATIENT
Start: 2024-05-20

## 2024-05-20 RX ORDER — AMLODIPINE BESYLATE 5 MG/1
5 TABLET ORAL DAILY
Qty: 90 TABLET | Refills: 1 | Status: SHIPPED | OUTPATIENT
Start: 2024-05-20

## 2024-05-20 RX ORDER — METFORMIN HYDROCHLORIDE 500 MG/1
500 TABLET ORAL
Qty: 60 TABLET | Refills: 3 | Status: SHIPPED | OUTPATIENT
Start: 2024-05-20

## 2024-05-20 RX ORDER — FUROSEMIDE 40 MG/1
40 TABLET ORAL DAILY
Qty: 30 TABLET | Refills: 3 | Status: SHIPPED | OUTPATIENT
Start: 2024-05-20 | End: 2025-05-20

## 2024-05-20 RX ORDER — POTASSIUM CHLORIDE 20 MEQ/1
20 TABLET, EXTENDED RELEASE ORAL DAILY
Qty: 90 TABLET | Refills: 1 | Status: SHIPPED | OUTPATIENT
Start: 2024-05-20

## 2024-05-20 NOTE — PROGRESS NOTES
Subjective   Patient ID: Stew Rubin is a 79 y.o. male who presents for Joint Swelling.  HPI    Patient presents in the office today with complaints of right foot swelling and would like to go over xray results that was done on 5/15/24. Patient states his ankles and feet are still swollen. Patient states it is difficult to walk and he feels as if he is wobbling. Patient states when he was put onto the Lasix he did well and now the swelling has returned in the right foot and ankle.  Patient states he thinks it is worse.  Has tried elevating his feet.     Patient states he has doubled his dose of lasix. Patient is ordered 40 mg of Lasix and today he took 80 mg to try to get the swelling down.     Review of Systems  Constitutional:  no chills, no fever and no night sweats.  Eyes: no blurred vision and no eyesight problems.  ENT: no hearing loss, no nasal congestion, no hoarseness and no sore throat.  Neck: no mass (es) and no swelling.  Cardiovascular: no chest pain, no intermittent leg claudication, no lower extremity edema, no palpitation and no syncope.  Respiratory: no cough, no shortness of breath during exertion, no shortness of breath at rest and no wheezing.  Gastrointestinal: no abdominal pain, no blood in stools, no constipation, no diarrhea, no melena, no nausea, no rectal pain and no vomiting.  Genitourinary: no dysuria, no change in urinary frequency, no urinary hesitancy and no feelings of urinary urgency.  Musculoskeletal: no arthralgias, no back pain and no myalgias.  Integumentary: no new skin lesions and no rashes.  Neurological: no difficulty walking, no headache, no limb weakness, no numbness and no tingling.  Psychiatric/Behavioral: no anxiety, no depression, no anhedonia and no substance use disorders.  Endocrine: no recent weight gain and no recent weight loss.  Hematologic/Lymphatic: no tendency for easy bruising and no swollen glands    Objective   Physical Exam  Patient in with complaints of  swelling in his right foot and ankle x-rays are negative.  Except for arthritic change.  He has a history of gout and is exquisitely tender over the base of the great toe.  May be a gout flare causing the foot edema.  Plan is to put him on a burst and taper prednisone if this is not significantly improved things needs to be seen by foot and ankle specialist he states that he has someone at the clinic he usually sees if this does not help he will call the.  /76 (BP Location: Right arm, Patient Position: Sitting)   Pulse 63   Temp 35.9 °C (96.7 °F) (Temporal)   Ht 1.829 m (6')   Wt 103 kg (226 lb 12.8 oz)   SpO2 94%   BMI 30.76 kg/m²     Lab Results   Component Value Date    WBC 6.2 02/23/2023    HGB 13.8 02/23/2023    HCT 40.8 (L) 02/23/2023    MCV 99 02/23/2023     02/23/2023       Assessment/Plan   Problem List Items Addressed This Visit       Essential hypertension    Hypokalemia     Other Visit Diagnoses       Right ankle swelling    -  Primary    BMI 30.0-30.9,adult        Class 1 obesity due to excess calories with serious comorbidity and body mass index (BMI) of 30.0 to 30.9 in adult

## 2024-05-22 ENCOUNTER — TELEPHONE (OUTPATIENT)
Dept: PRIMARY CARE | Facility: CLINIC | Age: 79
End: 2024-05-22
Payer: MEDICARE

## 2024-05-22 NOTE — TELEPHONE ENCOUNTER
PATIENT CALLED INTO THE OFFICE STATING THAT HIS PHARMACY DID NOT GET THE SCRIPT FOR HIS PREDNISONE ON MONDAY.    I CALLED [employer] AND SPOKE TO THE TECHNICIAN. SHE STATES THAT HE DID  THE PREDNISONE ALONG WITH THE POTASSIUM , AMLODIPINE AND METFORMIN.    THE 5TH MEDICATION FUROSEMIDE WAS NOT FILLED BECAUSE THE INSURANCE KICKED IT BACK STATING THAT IT WAS TO SOON TO BE FILLED.    I CALLED AND LEFT MESSAGE FOR PATIENT TO CALL BACK.    HE NEEDS TO LOOK AT THE PILL BOTTLES AND CHECK THE NAMES ON THE BOTTLES.

## 2024-06-04 DIAGNOSIS — G30.9 ALZHEIMER DISEASE (MULTI): ICD-10-CM

## 2024-06-04 DIAGNOSIS — F02.80 ALZHEIMER DISEASE (MULTI): ICD-10-CM

## 2024-06-04 RX ORDER — DONEPEZIL HYDROCHLORIDE 10 MG/1
10 TABLET, FILM COATED ORAL NIGHTLY
Qty: 90 TABLET | Refills: 1 | Status: SHIPPED | OUTPATIENT
Start: 2024-06-04

## 2024-06-04 NOTE — TELEPHONE ENCOUNTER
Rx Refill Request Telephone Encounter    Name:  Stew uRbin  :  [id number]  Medication Name:  donepezil (Aricept) 10 mg tablet       Specific Pharmacy location:  [facility]   Date of last appointment:  2024  Date of next appointment:  none   Best number to reach patient:  [phone]

## 2024-06-10 DIAGNOSIS — E78.2 MIXED HYPERLIPIDEMIA: ICD-10-CM

## 2024-06-10 DIAGNOSIS — I10 HYPERTENSION, UNSPECIFIED TYPE: ICD-10-CM

## 2024-06-10 RX ORDER — PRAVASTATIN SODIUM 20 MG/1
20 TABLET ORAL NIGHTLY
Qty: 90 TABLET | Refills: 1 | Status: SHIPPED | OUTPATIENT
Start: 2024-06-10

## 2024-06-10 RX ORDER — METOPROLOL TARTRATE 50 MG/1
50 TABLET ORAL 2 TIMES DAILY
Qty: 60 TABLET | Refills: 3 | Status: SHIPPED | OUTPATIENT
Start: 2024-06-10

## 2024-06-10 NOTE — TELEPHONE ENCOUNTER
Rx Refill Request Telephone Encounter    Name:  Stew Rubin  :  [id number]  Medication Name:  metoprolol tartrate (Lopressor) 50 mg tablet   Medication Name:  pravastatin (Pravachol) 20 mg tablet     Specific Pharmacy location:  [facility]   Date of last appointment:  2024  Date of next appointment:  none   Best number to reach patient:  [phone]

## 2024-07-17 DIAGNOSIS — E87.6 HYPOKALEMIA: ICD-10-CM

## 2024-07-17 DIAGNOSIS — E78.2 MIXED HYPERLIPIDEMIA: ICD-10-CM

## 2024-07-17 DIAGNOSIS — I10 HYPERTENSION, UNSPECIFIED TYPE: ICD-10-CM

## 2024-07-17 DIAGNOSIS — S06.0X0A CONCUSSION WITHOUT LOSS OF CONSCIOUSNESS, INITIAL ENCOUNTER: ICD-10-CM

## 2024-07-17 RX ORDER — POTASSIUM CHLORIDE 20 MEQ/1
20 TABLET, EXTENDED RELEASE ORAL DAILY
Qty: 90 TABLET | Refills: 0 | Status: SHIPPED | OUTPATIENT
Start: 2024-07-17

## 2024-07-17 RX ORDER — TERBINAFINE HYDROCHLORIDE 250 MG/1
250 TABLET ORAL DAILY
Qty: 90 TABLET | Refills: 0 | Status: SHIPPED | OUTPATIENT
Start: 2024-07-17 | End: 2024-10-15

## 2024-07-17 RX ORDER — METOPROLOL TARTRATE 50 MG/1
50 TABLET ORAL 2 TIMES DAILY
Qty: 180 TABLET | Refills: 0 | Status: SHIPPED | OUTPATIENT
Start: 2024-07-17

## 2024-07-17 RX ORDER — EZETIMIBE 10 MG/1
TABLET ORAL
Qty: 90 TABLET | Refills: 0 | Status: SHIPPED | OUTPATIENT
Start: 2024-07-17

## 2024-07-17 NOTE — TELEPHONE ENCOUNTER
Patient calling - needs refills on:    Ezetimibe 10 mg - 1 tablet daily    Metoprolol tartrate 50 mg - 1 tablet twice a day    Klor-Con 20 meq - 1 tablet daily    Terbinafine 250 mg - 1 tablet daily    [facility]    Last ov: 5/20/24

## 2024-07-31 DIAGNOSIS — R41.3 MEMORY LOSS: ICD-10-CM

## 2024-07-31 RX ORDER — MEMANTINE HYDROCHLORIDE 21 MG/1
21 CAPSULE, EXTENDED RELEASE ORAL DAILY
Qty: 90 CAPSULE | Refills: 0 | Status: SHIPPED | OUTPATIENT
Start: 2024-07-31

## 2024-07-31 NOTE — TELEPHONE ENCOUNTER
MEDICATION PENDED    Rx Refill Request Telephone Encounter    Name:  Stew Rubin  : 1945     Medication Name:  memantine  Dose (Optional):    21mg  Quantity (Optional):    #90  Directions (Optional):   Take 1 capsule (21 mg) by mouth once daily     ALLERGIES:   see list     Specific Pharmacy location:  [facility]     Date of last appointment:  24  Date of next appointment:  none     Best number to reach patient:  [phone]

## 2024-08-06 NOTE — PROGRESS NOTES
Subjective   Patient ID: Stew Rubin is a 79 y.o. male who presents for Back Pain and Atrial Fibrillation.  HPI    Patient presents in the office today to discuss a possible upcoming back surgery. Patient states that he had back surgery about 10 years ago. He states now his walking has diminished due to pain in the back. Patient is going to a new surgeon and is going through rehab before the surgery can be done.     Patient would like to arrange for a colonoscopy.    Patient would like you to know that he was off of his metoprolol for about 20 days due to [employer] error. Patient states he was sent for  a stress test and they had to stop the test because his blood pressure went high. Patient is going back to cardiologist on 8/14/24. Patient thinks he is now in Afib. Patient admits to shortness of breath at times.    Review of Systems  Constitutional:  no chills, no fever and no night sweats.  Eyes: no blurred vision and no eyesight problems.  ENT: no hearing loss, no nasal congestion, no hoarseness and no sore throat.  Neck: no mass (es) and no swelling.  Cardiovascular: no chest pain, no intermittent leg claudication, no lower extremity edema, no palpitation and no syncope.  Respiratory: no cough, no shortness of breath during exertion, no shortness of breath at rest and no wheezing.  Gastrointestinal: no abdominal pain, no blood in stools, no constipation, no diarrhea, no melena, no nausea, no rectal pain and no vomiting.  Genitourinary: no dysuria, no change in urinary frequency, no urinary hesitancy and no feelings of urinary urgency.  Musculoskeletal: no arthralgias, no back pain and no myalgias.  Integumentary: no new skin lesions and no rashes.  Neurological: no difficulty walking, no headache, no limb weakness, no numbness and no tingling.  Psychiatric/Behavioral: no anxiety, no depression, no anhedonia and no substance use disorders.  Endocrine: no recent weight gain and no recent weight  loss.  Hematologic/Lymphatic: no tendency for easy bruising and no swollen glands    Objective   Physical Exam  Patient today in A-fib again has follow-up appointment in a week with cardiology has had cardioversions that last for a while whether or not this is going to be considered now chronic will see what cardiology says.  Also due for colonoscopy refer to GI he has nail fungus not responding to terbinafine will refer to podiatry for that.  Denies chest pain or shortness of breath he has lost some weight his weights been pretty steady pursing in the morning is 2 30-2 32.  Lungs are clear no wheeze rhonchi crackles retractions cardiac and abdominal exams benign he still has his chronic lumbar pain he is doing physical therapy if that does not help neuro neurosurgeon is going to get a CAT scan of his spine to see if there is a surgical remedy or not.  /80 (BP Location: Right arm, Patient Position: Sitting)   Pulse 68   Temp 36.6 °C (97.9 °F) (Temporal)   Ht 1.829 m (6')   Wt 109 kg (239 lb 12.8 oz)   SpO2 97%   BMI 32.52 kg/m²     Lab Results   Component Value Date    WBC 6.2 02/23/2023    HGB 13.8 02/23/2023    HCT 40.8 (L) 02/23/2023    MCV 99 02/23/2023     02/23/2023       Assessment/Plan plan refer to urology podiatry get his colonoscopy done await recommendations by cardiology  Problem List Items Addressed This Visit          Cardiac and Vasculature    A-fib (Multi)    Relevant Orders    ECG 12 lead (Clinic Performed)       Endocrine/Metabolic    Controlled diabetes mellitus with diabetic neuropathy, without long-term current use of insulin (Multi)    Relevant Medications    metFORMIN (Glucophage) 500 mg tablet    BMI 32.0-32.9,adult    Class 1 obesity due to excess calories with serious comorbidity and body mass index (BMI) of 32.0 to 32.9 in adult     Other Visit Diagnoses       Back pain, unspecified back location, unspecified back pain laterality, unspecified chronicity    -  Primary     Screening for colon cancer        Relevant Orders    Colonoscopy Screening; Average Risk Patient    Urinary incontinence without sensory awareness        Relevant Orders    Referral to Urology

## 2024-08-07 ENCOUNTER — TELEPHONE (OUTPATIENT)
Dept: PRIMARY CARE | Facility: CLINIC | Age: 79
End: 2024-08-07

## 2024-08-07 ENCOUNTER — OFFICE VISIT (OUTPATIENT)
Dept: PRIMARY CARE | Facility: CLINIC | Age: 79
End: 2024-08-07
Payer: MEDICARE

## 2024-08-07 VITALS
HEIGHT: 72 IN | HEART RATE: 68 BPM | TEMPERATURE: 97.9 F | WEIGHT: 239.8 LBS | SYSTOLIC BLOOD PRESSURE: 132 MMHG | OXYGEN SATURATION: 97 % | DIASTOLIC BLOOD PRESSURE: 80 MMHG | BODY MASS INDEX: 32.48 KG/M2

## 2024-08-07 DIAGNOSIS — M54.9 BACK PAIN, UNSPECIFIED BACK LOCATION, UNSPECIFIED BACK PAIN LATERALITY, UNSPECIFIED CHRONICITY: Primary | ICD-10-CM

## 2024-08-07 DIAGNOSIS — I48.91 ATRIAL FIBRILLATION, UNSPECIFIED TYPE (MULTI): ICD-10-CM

## 2024-08-07 DIAGNOSIS — N39.42 URINARY INCONTINENCE WITHOUT SENSORY AWARENESS: ICD-10-CM

## 2024-08-07 DIAGNOSIS — E11.40 CONTROLLED TYPE 2 DIABETES MELLITUS WITH DIABETIC NEUROPATHY, WITHOUT LONG-TERM CURRENT USE OF INSULIN (MULTI): ICD-10-CM

## 2024-08-07 DIAGNOSIS — E66.09 CLASS 1 OBESITY DUE TO EXCESS CALORIES WITH SERIOUS COMORBIDITY AND BODY MASS INDEX (BMI) OF 32.0 TO 32.9 IN ADULT: ICD-10-CM

## 2024-08-07 DIAGNOSIS — Z12.11 SCREENING FOR COLON CANCER: ICD-10-CM

## 2024-08-07 PROCEDURE — 99213 OFFICE O/P EST LOW 20 MIN: CPT | Performed by: FAMILY MEDICINE

## 2024-08-07 PROCEDURE — 3075F SYST BP GE 130 - 139MM HG: CPT | Performed by: FAMILY MEDICINE

## 2024-08-07 PROCEDURE — 3079F DIAST BP 80-89 MM HG: CPT | Performed by: FAMILY MEDICINE

## 2024-08-07 PROCEDURE — 1036F TOBACCO NON-USER: CPT | Performed by: FAMILY MEDICINE

## 2024-08-07 PROCEDURE — 1123F ACP DISCUSS/DSCN MKR DOCD: CPT | Performed by: FAMILY MEDICINE

## 2024-08-07 PROCEDURE — 93000 ELECTROCARDIOGRAM COMPLETE: CPT | Performed by: FAMILY MEDICINE

## 2024-08-07 PROCEDURE — 1159F MED LIST DOCD IN RCRD: CPT | Performed by: FAMILY MEDICINE

## 2024-08-07 RX ORDER — METFORMIN HYDROCHLORIDE 500 MG/1
500 TABLET ORAL
Qty: 180 TABLET | Refills: 1 | Status: SHIPPED | OUTPATIENT
Start: 2024-08-07 | End: 2024-11-05

## 2024-08-07 NOTE — TELEPHONE ENCOUNTER
Spoke with Franci at scheduling and set up appointment for patient with Jessi Dimas CNP for Monday 9/16/24 @ 11:00am at [address] [place] [address] [facility] [address] [id number] [phone]    Called patient and lmom for patient with appointment info and to rtc to confirm  [phone]

## 2024-08-08 ENCOUNTER — APPOINTMENT (OUTPATIENT)
Dept: UROLOGY | Facility: CLINIC | Age: 79
End: 2024-08-08
Payer: MEDICARE

## 2024-08-09 ENCOUNTER — TELEPHONE (OUTPATIENT)
Dept: PRIMARY CARE | Facility: CLINIC | Age: 79
End: 2024-08-09
Payer: MEDICARE

## 2024-08-09 ENCOUNTER — TELEPHONE (OUTPATIENT)
Dept: GASTROENTEROLOGY | Facility: CLINIC | Age: 79
End: 2024-08-09
Payer: MEDICARE

## 2024-08-09 NOTE — TELEPHONE ENCOUNTER
Patient was scheduled for colonoscopy. Patient is 79. Office policy states patient's over age 75 must be seen in the office prior to any colonoscopy being scheduled.

## 2024-08-09 NOTE — TELEPHONE ENCOUNTER
PATIENT IS CALLING - PATIENT REPORTS THAT HE RECEIVED A CALL FROM US (IT WAS NOT US THAT CALLED BUT DR. MCDERMOTT'S OFFICE) LEFT A MESSAGE THAT SAID THAT HE NEEDS A CLEARANCE FROM CARDIOLOGIST BEFORE COLONOSCOPY COULD BE DONE (HAS AN APPOINTMENT WITH CARDIOLOGIST ON 8/14/24) BUT HE REPORTS THAT YOU TOLD HIM HIS AFIB WAS MILD, THAT HE DIDN'T NEED TO WORRY ABOUT IT (COLONOSCOPY SCHEDULED FOR 8/23/24) DOES HE STILL NEED TO SEE CARDIOLOGIST? WOULD CARDIOLOGIST BE THE ONE TO CLEAR HIM?  PLEASE ADVISE.

## 2024-08-09 NOTE — TELEPHONE ENCOUNTER
Patient saw Dr. Calle in May, was advised to get clearance from ortho and cardiology prior to procedure, would need to follow up after clearance. LVM advising pt we need clearance and follow up prior to proceeding with colonoscopy.     Clearances sent to both ortho and cardiology.

## 2024-08-12 ENCOUNTER — TELEPHONE (OUTPATIENT)
Dept: GASTROENTEROLOGY | Facility: CLINIC | Age: 79
End: 2024-08-12
Payer: MEDICARE

## 2024-08-12 NOTE — TELEPHONE ENCOUNTER
Left message for patient. His colon appointment was to be made with Dr. Calle. He will receive a call when he has been cleared.

## 2024-08-12 NOTE — TELEPHONE ENCOUNTER
Garrett Ha MD  Do [id number] Clerical3 days ago       If the gastroenterologist is saying he needs to be cleared and see a cardiologist there is nothing I can do about that.  Follow-up with his cardiologist

## 2024-08-23 ENCOUNTER — APPOINTMENT (OUTPATIENT)
Dept: GASTROENTEROLOGY | Facility: EXTERNAL LOCATION | Age: 79
End: 2024-08-23
Payer: MEDICARE

## 2024-08-30 DIAGNOSIS — I10 ESSENTIAL HYPERTENSION: ICD-10-CM

## 2024-08-30 RX ORDER — AMLODIPINE BESYLATE 5 MG/1
5 TABLET ORAL DAILY
Qty: 90 TABLET | Refills: 1 | Status: SHIPPED | OUTPATIENT
Start: 2024-08-30

## 2024-08-30 NOTE — TELEPHONE ENCOUNTER
Rx Refill Request Telephone Encounter    Name:  Stew Rubin  : 1945     Medication Name:  AMLODIPINE  Dose (Optional):    5 MG  Quantity (Optional):      Directions (Optional):   1 TABLET DAILY    ALLERGIES:   ON FILE    Specific Pharmacy location:  [facility]    Date of last appointment:  24  Date of next appointment:  NONE    Best number to reach patient:

## 2024-09-03 DIAGNOSIS — G30.9 ALZHEIMER DISEASE (MULTI): ICD-10-CM

## 2024-09-03 DIAGNOSIS — F02.80 ALZHEIMER DISEASE (MULTI): ICD-10-CM

## 2024-09-03 NOTE — TELEPHONE ENCOUNTER
MEDICATION PENDED  Patient is calling to request a RX refill. Aware this will be left with another provider since Dr Ha is out of the office    Rx Refill Request Telephone Encounter    Name:  Stew Rubin  : 1945     Medication Name:  donepezil  Dose (Optional):    10 mg  Quantity (Optional):    #90  Directions (Optional):   Take 1 tablet (10 mg) by mouth once daily at bedtime     ALLERGIES:   see list     Specific Pharmacy location:  [facility]     Date of last appointment:  24  Date of next appointment:  none     Best number to reach patient:  [phone]

## 2024-09-04 ENCOUNTER — EVALUATION (OUTPATIENT)
Dept: PHYSICAL THERAPY | Facility: CLINIC | Age: 79
End: 2024-09-04
Payer: MEDICARE

## 2024-09-04 DIAGNOSIS — M48.061 SPINAL STENOSIS, LUMBAR REGION WITHOUT NEUROGENIC CLAUDICATION: ICD-10-CM

## 2024-09-04 PROCEDURE — 97110 THERAPEUTIC EXERCISES: CPT | Mod: GP | Performed by: GENERAL ACUTE CARE HOSPITAL

## 2024-09-04 PROCEDURE — 97162 PT EVAL MOD COMPLEX 30 MIN: CPT | Mod: GP | Performed by: GENERAL ACUTE CARE HOSPITAL

## 2024-09-04 RX ORDER — DONEPEZIL HYDROCHLORIDE 10 MG/1
10 TABLET, FILM COATED ORAL NIGHTLY
Qty: 90 TABLET | Refills: 1 | Status: SHIPPED | OUTPATIENT
Start: 2024-09-04

## 2024-09-04 ASSESSMENT — ENCOUNTER SYMPTOMS
OCCASIONAL FEELINGS OF UNSTEADINESS: 0
LOSS OF SENSATION IN FEET: 0
DEPRESSION: 0

## 2024-09-04 NOTE — PROGRESS NOTES
Patient Name: Stew Rubin  MRN: [id number]  Today's Date: 9/4/2024     Current Problem:  1. 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  Referral to Physical Therapy    Follow Up In Physical Therapy          Visit 1/20    Primary Complaint/ Functional Limitation: unable to walk any distance   Prior level of function:limited ability to walk > 50 feet before sitting down   Date of onset:10 years ago had MRI nerve issue at L3 (spinal stenosis) had laminectomy surgery- had to have surgery L1-L5  Cause:stenosis  Pain Location:lower back central   Current Pain: 0/10  Worst Pain: 5-7/10  Description of pain:pain in right foot 4 weeks ago came out of nowhere and resoled on its own while using a cane to unload weight   Aggravating Activities: standing > 90 seconds, walking > 50 feet   Relieving Activities:sitting down   Work Requirements/ Hobbies:retired   Prior Treatment and results of Prior treatment: PT in past for various things   Constitutional Symptoms: Patient Denies   Fever Chills Nausea Vomiting Loss of appetite Abdominal pain Change in bowel function Weight loss or gain Headache Unexplained fatigue Malaise Lethargy Weakness Arthralgia  Myalgia  Difficulty in sleeping Breathing trouble  Barriers to treatment/ learning: none   Fall in Feb hit head concussion and 3 fractured vertebrae   Recent Afib  Getting winded with minor exertion for the last year   Heart bypass x5 recent stress tests performed findings Afib    Bilateral UCKR     Gait: short distance  forward trunk  Posture:forward trunk  Lumbar ROM:     Flexion: no reversal     Extension: mod restrict     Flexibility ( R/L):     [id number]/25     SCOTT -/-     FADIR+/+     OBERS+/+     Piriformis-/-     Mod Pedrito+/+     Rectus+/+    Strength:     Upper Abdominals:   3/5                   Transverse Abdominis: Poor     Multifidus:   Poor     Pelvic Floor: Poor     Bridge:  2 /3     Hamstring Dominant       Hip Flexion (R/L):  4/5  4 /5     Hip  Abduction:   4/5  4 /5     Hip External Rot (Clamshell):   4/5   4/5     Hip Extension:   4/5   4/5               Knee Ext (R/L):   36#, 45#     Knee Flex (R/L):   34#, 41#     Ankle DF (R/L): 4+  /5, 4+ /5    Deep Tendon Reflex: (R/L)     Patella DTR 2+/2+     Achilles DTR  2+/2+    Myotomes: (R/L)            All WNL                                             L2 Hip Flexion     L3 Knee Extension     L4 Ankle DF/ Eversion     L5 G. Toe Extension/Abd     S1 Ankle PF/ Eversion    Dermatomes: (R/L)     All WNL     L2 Anterior thigh     L3 Medial Knee     L4 Medial ankle/G. Toe     L5 Dorsal Foot     S1 Lateral Foot     S2 Posterior Thigh      Access Code: [email or web address]  URL: [email or web address]  Date: 09/04/2024  Prepared by: Gus Egan    Exercises  - Supine Transversus Abdominis Bracing - Hands on Stomach  - 3 x daily - 7 x weekly - 10 reps - 10 hold  - Supine Pelvic Floor Contraction  - 3 x daily - 7 x weekly - 10 reps - 10 hold  - Supine Multifidus with Heel Press  - 3 x daily - 7 x weekly - 10 reps - 10 hold    Evaluation, Tests and measures, Education including HEP instruction and feedback. Patient appears to be compliant and motivated to perform HEP as instructed and participate in active physical therapy as indicated. The patient was educated on: the importance of positioning, proper posture, and body mechanics, joint mechanics and pathology, general tissue healing time, the appropriate use of heat and cold to control pain and inflammation, the importance of general therapeutic exercise, especially to stay within pain-free ROM, specific anatomy, function, & regional interdependence of involved areas, & likely cause of impairments & POC.  Patient's questions were answered to their satisfaction, & patient verbalized understanding & agreement with POC.  The patient presents to Physical Therapy with signs and symptoms consistent with the medical diagnosis of spinal stenosis. Key  impairments include:  pain decreased strength and difficulty with functional activities such as walking. Skilled PT is required to address these key impairments and to provide and progress with an appropriate home exercise program. This evaluation is of  moderate complexity due to the nature of the patient's presentation as well as the comorbidities and medical factors included in this evaluation.  Treatment may include: Therapeutic Exercise (PROM, AA/AROM, Flexibility, Strengthening, Stabilization, HEP instruction). Therapeutic Activities (Transfers, Body Mechanics, Work Related Activities, Closed Chain, Agility and Power).   Manual Techniques (Soft tissue Mobilization, Joint mobilization/Distraction, Muscle Energy Techniques, Lymphatic Drainage, Dry Needling).  Neuromuscular Reeducation (Postural Training, Balance/Proprioception, Relaxation Techniques). Biofeedback. Aquatic Exercise. Modalities: Ultrasound, Moist Heat, Cryotherapy, Vasopneumatic with or without Cryotherapy. Electrical Stimulation (TENS/IFC/ Pre modulated for pain relief, NMES for Muscle reeducation). Gait Training. Orthotic Fit and Training. Strapping, Kinesio taping.     POC: 1-2x/week 5 weeks or 10 visits    Patient will report resting pain on Visual Analog Scale < or = 0 .   Pain at worst will be < or =0  .   Pain with (patients primary complaint) will be < or = 0 .  Oswestry Disability Index (SHERI) score will improve >/= 13 points to demonstrate overall functional improvement as it relates to patients back complaints.    Lumbar AROM will improve to: flexion >/=   90  , extension >/=  20    ,  side bend >/=  25   to improve joint mechanics with functional activities.    Upper abdominal strength will be  >/= 5  , lower abdominal strength will be >/=  45 , internal/external oblique strength will be >/=  5   ,to improve lumbar stabilization during activities.    Patient will demonstrate activation of transverse abdominis, multifidus, and pelvic  floor during stabilization program and with upright posture to demonstrate improved core stabilization and reduce risk of reinjury.    Patient will improve hamstring length >/= 10 degrees to demonstrate improved joint mechanics during functional activities.      Strength in Hip Abduction >/=  5/5  , Hip Extension >/=  5/5  , Hip External Rotation >/=5/5    , to improve joint stability with functional activities including ambulation.      Patient will correctly perform home exercise program independently, demonstrated through patient teach back upon discharge.

## 2024-09-04 NOTE — LETTER
September 4, 2024    AIDEE Garg  [address]  [facility] [place] [address]    Patient: Stew Rubin   YOB: 1945   Date of Visit: 9/4/2024       Dear AIDEE Garg  [address]  [facility] [address]    The attached plan of care is being sent to you because your patient’s medical reimbursement requires that you certify the plan of care. Your signature is required to allow uninterrupted insurance coverage.      You may indicate your approval by signing below and faxing this form back to us at Dept Fax: [phone].    Please call Dept: [phone] with any questions or concerns.    Thank you for this referral,        Gus Egan, MEHUL  [address] [address]    Payer: Payor: [employer] MEDICARE / Plan: [employer]AN[employer] CONTRERAS MEDICARE / Product Type: *No Product type* /                                                                         Date:     Dear Gus Egan, PT,     Re: Mr. Stew Rubin, MRN:[id number]    I certify that I have reviewed the attached plan of care and it is medically necessary for Mr. Stew Rubin (1945) who is under my care.          ______________________________________                    _________________  Provider name and credentials                                           Date and time                                                                                           Plan of Care 9/4/24   Effective from: 9/4/2024  Effective to: 12/3/2024    Plan ID: [id number]            Participants as of Finalize on 9/4/2024    Name Type Comments Contact Info    AIDEE Garg Referring Provider  [phone]    Gus Egan, PT Physical Therapist  [phone]       Last Plan Note     Author: Gus Egan PT Status: Incomplete Last edited: 9/4/2024  7:45 AM       Patient Name: Stew Rubin  MRN: [id number]  Today's Date: 9/4/2024      Current Problem:  1. 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  Referral to Physical Therapy    Follow Up In Physical Therapy          Visit 1/20    Primary Complaint/ Functional Limitation: unable to walk any distance   Prior level of function:limited ability to walk > 50 feet before sitting down   Date of onset:10 years ago had MRI nerve issue at L3 (spinal stenosis) had laminectomy surgery- had to have surgery L1-L5  Cause:stenosis  Pain Location:lower back central   Current Pain: 0/10  Worst Pain: 5-7/10  Description of pain:pain in right foot 4 weeks ago came out of nowhere and resoled on its own while using a cane to unload weight   Aggravating Activities: standing > 90 seconds, walking > 50 feet   Relieving Activities:sitting down   Work Requirements/ Hobbies:retired   Prior Treatment and results of Prior treatment: PT in past for various things   Constitutional Symptoms: Patient Denies   Fever Chills Nausea Vomiting Loss of appetite Abdominal pain Change in bowel function Weight loss or gain Headache Unexplained fatigue Malaise Lethargy Weakness Arthralgia  Myalgia  Difficulty in sleeping Breathing trouble  Barriers to treatment/ learning: none   Fall in Feb hit head concussion and 3 fractured vertebrae   Recent Afib  Getting winded with minor exertion for the last year   Heart bypass x5 recent stress tests performed findings Afib    Bilateral UCKR     Gait: short distance  forward trunk  Posture:forward trunk  Lumbar ROM:     Flexion: no reversal     Extension: mod restrict     Flexibility ( R/L):     Ljtohaven37/25     SCOTT -/-     FADIR+/+     OBERS+/+     Piriformis-/-     Mod Pedrito+/+     Rectus+/+    Strength:     Upper Abdominals:   3/5                   Transverse Abdominis: Poor     Multifidus:   Poor     Pelvic Floor: Poor     Bridge:  2 /3     Hamstring Dominant       Hip Flexion (R/L):  4/5  4 /5     Hip Abduction:   4/5  4 /5     Hip External Rot (Clamshell):   4/5    4/5     Hip Extension:   4/5   4/5               Knee Ext (R/L):   36#, 45#     Knee Flex (R/L):   34#, 41#     Ankle DF (R/L): 4+  /5, 4+ /5    Deep Tendon Reflex: (R/L)     Patella DTR 2+/2+     Achilles DTR  2+/2+    Myotomes: (R/L)            All WNL                                             L2 Hip Flexion     L3 Knee Extension     L4 Ankle DF/ Eversion     L5 G. Toe Extension/Abd     S1 Ankle PF/ Eversion    Dermatomes: (R/L)     All WNL     L2 Anterior thigh     L3 Medial Knee     L4 Medial ankle/G. Toe     L5 Dorsal Foot     S1 Lateral Foot     S2 Posterior Thigh      Access Code: [email or web address]  URL: [email or web address]  Date: 09/04/2024  Prepared by: Gus Egan    Exercises  - Supine Transversus Abdominis Bracing - Hands on Stomach  - 3 x daily - 7 x weekly - 10 reps - 10 hold  - Supine Pelvic Floor Contraction  - 3 x daily - 7 x weekly - 10 reps - 10 hold  - Supine Multifidus with Heel Press  - 3 x daily - 7 x weekly - 10 reps - 10 hold    Evaluation, Tests and measures, Education including HEP instruction and feedback. Patient appears to be compliant and motivated to perform HEP as instructed and participate in active physical therapy as indicated. The patient was educated on: the importance of positioning, proper posture, and body mechanics, joint mechanics and pathology, general tissue healing time, the appropriate use of heat and cold to control pain and inflammation, the importance of general therapeutic exercise, especially to stay within pain-free ROM, specific anatomy, function, & regional interdependence of involved areas, & likely cause of impairments & POC.  Patient's questions were answered to their satisfaction, & patient verbalized understanding & agreement with POC.  The patient presents to Physical Therapy with signs and symptoms consistent with the medical diagnosis of spinal stenosis. Key impairments include:  pain decreased strength and difficulty with  functional activities such as walking. Skilled PT is required to address these key impairments and to provide and progress with an appropriate home exercise program. This evaluation is of  moderate complexity due to the nature of the patient's presentation as well as the comorbidities and medical factors included in this evaluation.  Treatment may include: Therapeutic Exercise (PROM, AA/AROM, Flexibility, Strengthening, Stabilization, HEP instruction). Therapeutic Activities (Transfers, Body Mechanics, Work Related Activities, Closed Chain, Agility and Power).   Manual Techniques (Soft tissue Mobilization, Joint mobilization/Distraction, Muscle Energy Techniques, Lymphatic Drainage, Dry Needling).  Neuromuscular Reeducation (Postural Training, Balance/Proprioception, Relaxation Techniques). Biofeedback. Aquatic Exercise. Modalities: Ultrasound, Moist Heat, Cryotherapy, Vasopneumatic with or without Cryotherapy. Electrical Stimulation (TENS/IFC/ Pre modulated for pain relief, NMES for Muscle reeducation). Gait Training. Orthotic Fit and Training. Strapping, Kinesio taping.     POC: 1-2x/week 5 weeks or 10 visits    Patient will report resting pain on Visual Analog Scale < or = 0 .   Pain at worst will be < or =0  .   Pain with (patients primary complaint) will be < or = 0 .  Oswestry Disability Index (SHERI) score will improve >/= 13 points to demonstrate overall functional improvement as it relates to patients back complaints.    Lumbar AROM will improve to: flexion >/=   90  , extension >/=  20    ,  side bend >/=  25   to improve joint mechanics with functional activities.    Upper abdominal strength will be  >/= 5  , lower abdominal strength will be >/=  45 , internal/external oblique strength will be >/=  5   ,to improve lumbar stabilization during activities.    Patient will demonstrate activation of transverse abdominis, multifidus, and pelvic floor during stabilization program and with upright posture to  demonstrate improved core stabilization and reduce risk of reinjury.    Patient will improve hamstring length >/= 10 degrees to demonstrate improved joint mechanics during functional activities.      Strength in Hip Abduction >/=  5/5  , Hip Extension >/=  5/5  , Hip External Rotation >/=5/5    , to improve joint stability with functional activities including ambulation.      Patient will correctly perform home exercise program independently, demonstrated through patient teach back upon discharge.         Current Participants as of 9/4/2024    Name Type Comments Contact Info    AIDEE Garg Referring Provider  [phone]    Signature pending    Gsu Egan PT Physical Therapist  [phone]    Signature pending

## 2024-09-04 NOTE — Clinical Note
September 4, 2024    Gus Egan PT  [address] [facility]  Rehab Services, [address] [address]    Patient: Stew Rubin   YOB: 1945   Date of Visit: 9/4/2024       Dear AIDEE Garg  [address] [place] [address]    The attached plan of care is being sent to you because your patient’s medical reimbursement requires that you certify the plan of care. Your signature is required to allow uninterrupted insurance coverage.      You may indicate your approval by signing below and faxing this form back to us at Dept Fax: [phone].    Please call Dept: [phone] with any questions or concerns.    Thank you for this referral,        Gus Egan PT  [address] [address]    Payer: Payor: [employer] MEDICARE / Plan: HANNAH CONTRERAS MEDICARE / Product Type: *No Product type* /                                                                         Date:     Dear Gus Egan PT,     Re: Mr. Stew Rubin, MRN:[id number]    I certify that I have reviewed the attached plan of care and it is medically necessary for Mr. Stew Rubin (1945) who is under my care.          ______________________________________                    _________________  Provider name and credentials                                           Date and time                                                                                           Plan of Care 9/4/24   Effective from: 9/4/2024  Effective to: 12/3/2024    Plan ID: [id number]            Participants as of Finalize on 9/4/2024    Name Type Comments Contact Info    AIDEE Garg Referring Provider  [phone]    Gus Egan PT Physical Therapist  [phone]       Last Plan Note     Author: Gus Egan PT Status: Incomplete Last edited: 9/4/2024  7:45 AM       Patient Name: Stew Rubin  MRN: [id number]  Today's Date:  9/4/2024     Current Problem:  1. 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  Referral to Physical Therapy    Follow Up In Physical Therapy          Visit 1/20    Primary Complaint/ Functional Limitation: unable to walk any distance   Prior level of function:limited ability to walk > 50 feet before sitting down   Date of onset:10 years ago had MRI nerve issue at L3 (spinal stenosis) had laminectomy surgery- had to have surgery L1-L5  Cause:stenosis  Pain Location:lower back central   Current Pain: 0/10  Worst Pain: 5-7/10  Description of pain:pain in right foot 4 weeks ago came out of nowhere and resoled on its own while using a cane to unload weight   Aggravating Activities: standing > 90 seconds, walking > 50 feet   Relieving Activities:sitting down   Work Requirements/ Hobbies:retired   Prior Treatment and results of Prior treatment: PT in past for various things   Constitutional Symptoms: Patient Denies   Fever Chills Nausea Vomiting Loss of appetite Abdominal pain Change in bowel function Weight loss or gain Headache Unexplained fatigue Malaise Lethargy Weakness Arthralgia  Myalgia  Difficulty in sleeping Breathing trouble  Barriers to treatment/ learning: none   Fall in Feb hit head concussion and 3 fractured vertebrae   Recent Afib  Getting winded with minor exertion for the last year   Heart bypass x5 recent stress tests performed findings Afib    Bilateral UCKR     Gait: short distance  forward trunk  Posture:forward trunk  Lumbar ROM:     Flexion: no reversal     Extension: mod restrict     Flexibility ( R/L):     [id number]/25     SCOTT -/-     FADIR+/+     OBERS+/+     Piriformis-/-     Mod Pedrito+/+     Rectus+/+    Strength:     Upper Abdominals:   3/5                   Transverse Abdominis: Poor     Multifidus:   Poor     Pelvic Floor: Poor     Bridge:  2 /3     Hamstring Dominant       Hip Flexion (R/L):  4/5  4 /5     Hip Abduction:   4/5  4 /5     Hip External Rot (Clamshell):    4/5   4/5     Hip Extension:   4/5   4/5               Knee Ext (R/L):   36#, 45#     Knee Flex (R/L):   34#, 41#     Ankle DF (R/L): 4+  /5, 4+ /5    Deep Tendon Reflex: (R/L)     Patella DTR 2+/2+     Achilles DTR  2+/2+    Myotomes: (R/L)            All WNL                                             L2 Hip Flexion     L3 Knee Extension     L4 Ankle DF/ Eversion     L5 G. Toe Extension/Abd     S1 Ankle PF/ Eversion    Dermatomes: (R/L)     All WNL     L2 Anterior thigh     L3 Medial Knee     L4 Medial ankle/G. Toe     L5 Dorsal Foot     S1 Lateral Foot     S2 Posterior Thigh      Access Code: [email or web address]  URL: [email or web address]  Date: 09/04/2024  Prepared by: Gus Egan    Exercises  - Supine Transversus Abdominis Bracing - Hands on Stomach  - 3 x daily - 7 x weekly - 10 reps - 10 hold  - Supine Pelvic Floor Contraction  - 3 x daily - 7 x weekly - 10 reps - 10 hold  - Supine Multifidus with Heel Press  - 3 x daily - 7 x weekly - 10 reps - 10 hold    Evaluation, Tests and measures, Education including HEP instruction and feedback. Patient appears to be compliant and motivated to perform HEP as instructed and participate in active physical therapy as indicated. The patient was educated on: the importance of positioning, proper posture, and body mechanics, joint mechanics and pathology, general tissue healing time, the appropriate use of heat and cold to control pain and inflammation, the importance of general therapeutic exercise, especially to stay within pain-free ROM, specific anatomy, function, & regional interdependence of involved areas, & likely cause of impairments & POC.  Patient's questions were answered to their satisfaction, & patient verbalized understanding & agreement with POC.  The patient presents to Physical Therapy with signs and symptoms consistent with the medical diagnosis of spinal stenosis. Key impairments include:  pain decreased strength and difficulty with  functional activities such as walking. Skilled PT is required to address these key impairments and to provide and progress with an appropriate home exercise program. This evaluation is of  moderate complexity due to the nature of the patient's presentation as well as the comorbidities and medical factors included in this evaluation.  Treatment may include: Therapeutic Exercise (PROM, AA/AROM, Flexibility, Strengthening, Stabilization, HEP instruction). Therapeutic Activities (Transfers, Body Mechanics, Work Related Activities, Closed Chain, Agility and Power).   Manual Techniques (Soft tissue Mobilization, Joint mobilization/Distraction, Muscle Energy Techniques, Lymphatic Drainage, Dry Needling).  Neuromuscular Reeducation (Postural Training, Balance/Proprioception, Relaxation Techniques). Biofeedback. Aquatic Exercise. Modalities: Ultrasound, Moist Heat, Cryotherapy, Vasopneumatic with or without Cryotherapy. Electrical Stimulation (TENS/IFC/ Pre modulated for pain relief, NMES for Muscle reeducation). Gait Training. Orthotic Fit and Training. Strapping, Kinesio taping.     POC: 1-2x/week 5 weeks or 10 visits    Patient will report resting pain on Visual Analog Scale < or = 0 .   Pain at worst will be < or =0  .   Pain with (patients primary complaint) will be < or = 0 .  Oswestry Disability Index (SHERI) score will improve >/= 13 points to demonstrate overall functional improvement as it relates to patients back complaints.    Lumbar AROM will improve to: flexion >/=   90  , extension >/=  20    ,  side bend >/=  25   to improve joint mechanics with functional activities.    Upper abdominal strength will be  >/= 5  , lower abdominal strength will be >/=  45 , internal/external oblique strength will be >/=  5   ,to improve lumbar stabilization during activities.    Patient will demonstrate activation of transverse abdominis, multifidus, and pelvic floor during stabilization program and with upright posture to  demonstrate improved core stabilization and reduce risk of reinjury.    Patient will improve hamstring length >/= 10 degrees to demonstrate improved joint mechanics during functional activities.      Strength in Hip Abduction >/=  5/5  , Hip Extension >/=  5/5  , Hip External Rotation >/=5/5    , to improve joint stability with functional activities including ambulation.      Patient will correctly perform home exercise program independently, demonstrated through patient teach back upon discharge.         Current Participants as of 9/4/2024    Name Type Comments Contact Info    AIDEE Garg Referring Provider  [phone]    Signature pending    Gus Egan PT Physical Therapist  [phone]    Signature pending

## 2024-09-10 DIAGNOSIS — E78.2 MIXED HYPERLIPIDEMIA: ICD-10-CM

## 2024-09-10 RX ORDER — PRAVASTATIN SODIUM 20 MG/1
20 TABLET ORAL NIGHTLY
Qty: 90 TABLET | Refills: 1 | Status: SHIPPED | OUTPATIENT
Start: 2024-09-10

## 2024-09-10 NOTE — TELEPHONE ENCOUNTER
MEDICATION PENDED  Rx Refill Request Telephone Encounter    Name:  Stew Rubin  : 1945       pravastatin (Pravachol) 20 mg tablet [[id number]]    Order Details  Dose: 20 mg Route: oral Frequency: Nightly   Dispense Quantity: 90 tablet Refills: 1          Sig: Take 1 tablet (20 mg) by mouth once daily at bedtime.       ALLERGIES:   see list    Specific Pharmacy location: [facility]    Date of last appointment:  2024  Date of next appointment:  NONE    Best number to reach patient:  [phone]

## 2024-09-11 ENCOUNTER — TREATMENT (OUTPATIENT)
Dept: PHYSICAL THERAPY | Facility: CLINIC | Age: 79
End: 2024-09-11
Payer: MEDICARE

## 2024-09-11 DIAGNOSIS — M48.061 SPINAL STENOSIS, LUMBAR REGION WITHOUT NEUROGENIC CLAUDICATION: ICD-10-CM

## 2024-09-11 PROCEDURE — 97110 THERAPEUTIC EXERCISES: CPT | Mod: GP | Performed by: GENERAL ACUTE CARE HOSPITAL

## 2024-09-11 NOTE — PROGRESS NOTES
Patient Name: Stew Rubin  MRN: [id number]  Today's Date: 9/11/2024     Current Problem:  1. 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  Follow Up In Physical Therapy          Visit 2/20    Subjective:  Change in pain/ pain level: 2/10  Patient comments: no changes to this point    Objective: reviewed HEP    Treatment:    Therapeutic exercise (84731):    Access Code: [email or web address]  URL: [email or web address]  Date: 09/11/2024  Prepared by: Gus Egan    Exercises  - Supine Transversus Abdominis Bracing - Hands on Stomach  - 3 x daily - 7 x weekly - 10 reps - 10 hold  - Supine Pelvic Floor Contraction  - 3 x daily - 7 x weekly - 10 reps - 10 hold  - Supine Multifidus with Heel Press  - 3 x daily - 7 x weekly - 10 reps - 10 hold  - Seated Flexion Stretch with Swiss Ball  - 1-2 x daily - 7 x weekly - 20 reps  - Supine Knees to Chest with Swiss Ball  - 1-2 x daily - 7 x weekly - 20 reps  - Supine Lower Trunk Rotation with Swiss Ball  - 1-2 x daily - 7 x weekly - 20 reps  - Supine Bridge  - 1-2 x daily - 7 x weekly - 20 reps - 5 sec  hold  - Supine March  - 1 x daily - 7 x weekly - 10 reps  - Supine Transversus Abdominis Bracing with Leg Extension  - 1 x daily - 7 x weekly - 10 reps      Assessment:  Progressed HEP. Challenged with performance of core exercises. Patient notes improved pain levels resultant from activities in therapy session. Improved tissue quality noted as well as body mechanics demonstrated after cueing and corrections. Patient continues to require active therapy to progress functional capabilities with decreasing pain levels and improving mechanics with functional activities including progression of Home exercise program. Tolerance to today's treatment was good. Muscle fatigue noted.  Patient appears motivated and compliant this date, demonstrating a working understanding of principals instructed and home program.    Plan:  Progress with functional strength  as tolerated with respect to tissue healing. Manual therapy PRN to improve/maintain ROM, prevent adhesion, reduce pain.

## 2024-09-13 ENCOUNTER — TREATMENT (OUTPATIENT)
Dept: PHYSICAL THERAPY | Facility: CLINIC | Age: 79
End: 2024-09-13
Payer: MEDICARE

## 2024-09-13 DIAGNOSIS — M48.061 SPINAL STENOSIS, LUMBAR REGION WITHOUT NEUROGENIC CLAUDICATION: ICD-10-CM

## 2024-09-13 PROCEDURE — 97110 THERAPEUTIC EXERCISES: CPT | Mod: GP | Performed by: GENERAL ACUTE CARE HOSPITAL

## 2024-09-13 NOTE — PROGRESS NOTES
Patient Name: Stew Rubin  MRN: [id number]  Today's Date: 9/13/2024     Current Problem:  1. 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  Follow Up In Physical Therapy          Visit 3/20    Subjective:  Change in pain/ pain level: 3/10  Change in function: no changes   Patient comments: do not think that PT will be beneficial, still can not walk     Objective: 6  feet     Treatment:    Therapeutic exercise (58383):    Nustep 6 min   Squat   Heel raise  Stretches  6 mwt  Seated march LAQ  Access Code: [email or web address]  URL: [email or web address]  Date: 09/11/2024  Prepared by: Gus Egan     Exercises  - Supine Transversus Abdominis Bracing - Hands on Stomach  - 3 x daily - 7 x weekly - 10 reps - 10 hold  - Supine Pelvic Floor Contraction  - 3 x daily - 7 x weekly - 10 reps - 10 hold  - Supine Multifidus with Heel Press  - 3 x daily - 7 x weekly - 10 reps - 10 hold  - Seated Flexion Stretch with Swiss Ball  - 1-2 x daily - 7 x weekly - 20 reps  - Supine Knees to Chest with Swiss Ball  - 1-2 x daily - 7 x weekly - 20 reps  - Supine Lower Trunk Rotation with Swiss Ball  - 1-2 x daily - 7 x weekly - 20 reps  - Supine Bridge  - 1-2 x daily - 7 x weekly - 20 reps - 5 sec  hold  - Supine March  - 1 x daily - 7 x weekly - 10 reps  - Supine Transversus Abdominis Bracing with Leg Extension  - 1 x daily - 7 x weekly - 10 reps    Assessment:  Multiple rest breaks during 6 MWT (3), sitting on window sill. Patient continues to require active therapy to progress functional capabilities with decreasing pain levels and improving mechanics with functional activities including progression of Home exercise program. Tolerance to today's treatment was good. Muscle fatigue noted.   Patient appears motivated and compliant this date, demonstrating a working understanding of principals instructed and home program.    Plan:  Progress with functional strength as tolerated

## 2024-09-16 ENCOUNTER — APPOINTMENT (OUTPATIENT)
Dept: UROLOGY | Facility: CLINIC | Age: 79
End: 2024-09-16
Payer: MEDICARE

## 2024-09-16 VITALS — HEART RATE: 76 BPM | SYSTOLIC BLOOD PRESSURE: 171 MMHG | DIASTOLIC BLOOD PRESSURE: 94 MMHG

## 2024-09-16 DIAGNOSIS — C61 PROSTATE CANCER (MULTI): ICD-10-CM

## 2024-09-16 DIAGNOSIS — R32 URINARY INCONTINENCE, UNSPECIFIED TYPE: Primary | ICD-10-CM

## 2024-09-16 LAB
POC APPEARANCE, URINE: CLEAR
POC BILIRUBIN, URINE: ABNORMAL
POC BLOOD, URINE: ABNORMAL
POC COLOR, URINE: YELLOW
POC GLUCOSE, URINE: NEGATIVE MG/DL
POC KETONES, URINE: NEGATIVE MG/DL
POC LEUKOCYTES, URINE: NEGATIVE
POC NITRITE,URINE: NEGATIVE
POC PH, URINE: 6 PH
POC PROTEIN, URINE: ABNORMAL MG/DL
POC SPECIFIC GRAVITY, URINE: 1.02
POC UROBILINOGEN, URINE: 0.2 EU/DL

## 2024-09-16 PROCEDURE — G2211 COMPLEX E/M VISIT ADD ON: HCPCS | Performed by: NURSE PRACTITIONER

## 2024-09-16 PROCEDURE — 81003 URINALYSIS AUTO W/O SCOPE: CPT | Performed by: NURSE PRACTITIONER

## 2024-09-16 PROCEDURE — 3077F SYST BP >= 140 MM HG: CPT | Performed by: NURSE PRACTITIONER

## 2024-09-16 PROCEDURE — 1123F ACP DISCUSS/DSCN MKR DOCD: CPT | Performed by: NURSE PRACTITIONER

## 2024-09-16 PROCEDURE — 1159F MED LIST DOCD IN RCRD: CPT | Performed by: NURSE PRACTITIONER

## 2024-09-16 PROCEDURE — 99203 OFFICE O/P NEW LOW 30 MIN: CPT | Performed by: NURSE PRACTITIONER

## 2024-09-16 PROCEDURE — 3080F DIAST BP >= 90 MM HG: CPT | Performed by: NURSE PRACTITIONER

## 2024-09-16 PROCEDURE — 1036F TOBACCO NON-USER: CPT | Performed by: NURSE PRACTITIONER

## 2024-09-16 RX ORDER — VIBEGRON 75 MG/1
75 TABLET, FILM COATED ORAL DAILY
Qty: 90 TABLET | Refills: 0 | Status: SHIPPED | OUTPATIENT
Start: 2024-09-16 | End: 2024-12-15

## 2024-09-16 NOTE — PROGRESS NOTES
"Subjective   Patient ID: Stew Rubin is a 79 y.o. male who presents for Urine Leakage.  Patient states about 1 year ago he began having episodes of minor leakage, once every 2-3 weeks. Typically does not have nocturnal enuresis. NTF=2. States he attempts to urinate every 2-2.5 hours to avoid leakage which he states will frequently occur if he waits to urinate every 3-3.5 hours. Denies dysuria and gross hematuria. Patient states he tried pads, said they were turning black. Trying to manage with frequent urination during the day.     History of prostatectomy in 2019 for \"small grade\" prostate CA with Dr. Green. Last PSA was undetectable in 2023.     States he has a fairly strong stream. Typically has four \"bursts\" of urination. States he attempts to confirm complete emptying with valsava maneuver.     Drinks milk with meals, occasional OJ or lemonade, up to 25oz of water daily, and 1 ETOH before bed.     Recently diagnosed with A-fib, started on eliquis 1 month ago.         Review of Systems   All other systems reviewed and are negative.      Objective   Physical Exam  Vitals reviewed.     Constitutional: Patient generally appears stated age. Good nutrition. No deformities. Good attention to grooming.  Neck: No neck masses. Good symmetry. No crepitus. Normal thyroid size and consistency with no masses.  Respiratory: Normal respiratory effort. No intercostal retractions. No use of accessory muscles.  Cardiovascular: Examination of the peripheral vascular system reveals no swelling or varicosities with good pulses. Normal extremity temperature, no edema or tenderness.  Abdomen: Examination of the abdomen reveals no masses or tenderness. No hernias detected. Normal liver and spleen size.   Lymphatic: No palpable lymph nodes in the neck, axilla, groin or other locations  Skin: Inspection of the skin reveals no rashes, lesions, ulcers.  Neurologic/Psychiatric: Oriented to time, place and person. Normal mood and affect " with no depression, anxiety, or agitation.    Office Visit on 09/16/2024   Component Date Value Ref Range Status    POC Color, Urine 09/16/2024 Yellow  Straw, Yellow, Light-Yellow Final    POC Appearance, Urine 09/16/2024 Clear  Clear Final    POC Glucose, Urine 09/16/2024 NEGATIVE  NEGATIVE mg/dl Final    POC Bilirubin, Urine 09/16/2024 SMALL (1+) (A)  NEGATIVE Final    POC Ketones, Urine 09/16/2024 NEGATIVE  NEGATIVE mg/dl Final    POC Specific Gravity, Urine 09/16/2024 1.025  1.005 - 1.035 Final    POC Blood, Urine 09/16/2024 TRACE-Intact (A)  NEGATIVE Final    POC PH, Urine 09/16/2024 6.0  No Reference Range Established PH Final    POC Protein, Urine 09/16/2024 100 (2+) (A)  NEGATIVE, 30 (1+) mg/dl Final    POC Urobilinogen, Urine 09/16/2024 0.2  0.2, 1.0 EU/DL Final    Poc Nitrite, Urine 09/16/2024 NEGATIVE  NEGATIVE Final    POC Leukocytes, Urine 09/16/2024 NEGATIVE  NEGATIVE Final     PVR=0ml    Assessment/Plan   Diagnoses and all orders for this visit:  Urinary incontinence, unspecified type  -     Post-Void Residual  -     POCT UA Automated manually resulted  -     vibegron (Gemtesa) 75 mg tablet; Take 1 tablet (75 mg) by mouth once daily.  Prostate cancer (Multi)  -     PSA; Future    Will check PSA to confirm it remains undetectable. Will trial Gemtesa to aid in decreasing leakage and frequency. Follow up in a few months.          CONNER Olivera-CNP 09/16/24 11:25 AM

## 2024-09-17 ENCOUNTER — TREATMENT (OUTPATIENT)
Dept: PHYSICAL THERAPY | Facility: CLINIC | Age: 79
End: 2024-09-17
Payer: MEDICARE

## 2024-09-17 DIAGNOSIS — M48.061 SPINAL STENOSIS, LUMBAR REGION WITHOUT NEUROGENIC CLAUDICATION: ICD-10-CM

## 2024-09-17 PROCEDURE — 97110 THERAPEUTIC EXERCISES: CPT | Mod: GP,CQ

## 2024-09-17 NOTE — PROGRESS NOTES
"Patient Name: Stew Rubin  MRN: [id number]  Today's Date: 9/17/2024  Time Calculation  Start Time: 0900  Stop Time: 0945  Time Calculation (min): 45 min  Visit 4/20     Subjective:  \" Don't think PT is going to help \" States that insurance requires 7 PT sessions for approval of ordered CAT scan to help diagnosis LBP. Reports that he is limited with gait and standing duration and requires motorized scooter for grocery shopping due to inability to stand or walk. 50 Feet gait duration before seated rest needed.  States that gait duration has steadily continued to decreased.     Objective:  L LE decreased motor control . Lateral shift towards R with functional sit to stand .Decreased Wb in LE LE .    Assessment:   Pt has good understanding with current HEP .      Plan:   Progress LE /Trunk exercises     Treatment :   Therapeutic exercise (63911):    Nustep 10 min   Supine TA 10 seconds x10   Supine BKFO opposite LE extended x10   Hooklying abduction with Blue TB x10   Bridges 2x10   Clamshells x15   Squat   Heel raise  Stretches  6 mwt  Seated march   LAQ x15   Blue Tb x15 Hamstring curls   Sit to stand x15           Current Problem:  1. 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  Follow Up In Physical Therapy             Precautions: No recent falls        "

## 2024-09-20 ENCOUNTER — TREATMENT (OUTPATIENT)
Dept: PHYSICAL THERAPY | Facility: CLINIC | Age: 79
End: 2024-09-20
Payer: MEDICARE

## 2024-09-20 DIAGNOSIS — M48.061 SPINAL STENOSIS, LUMBAR REGION WITHOUT NEUROGENIC CLAUDICATION: ICD-10-CM

## 2024-09-20 PROCEDURE — 97110 THERAPEUTIC EXERCISES: CPT | Mod: GP,CQ

## 2024-09-20 NOTE — PROGRESS NOTES
Patient Name: Stew Rubin  MRN: [id number]  Today's Date: 9/20/2024  Time Calculation  Start Time: 0905  Stop Time: 0945  Time Calculation (min): 40 min  Visit 4/20   Subjective:  States that he continues to have limited ambulation duration due to SOB and increased LBP. Reports that heart Dr prescribed water pill again and was up all night due to medication. Relates increased LE fatigue to new medication .    Objective:  2 seated rest required with ambulation clinic . 1 from parking lot to 1st floor . 2nd  seated rest upon exiting elevator to ambulate down hallway to clinic.  Standing duration 30 seconds with 2 UE assist  before seated rest     Assessment:   Pt demos significant difficulty with standing activities in clinic .  Required seated rest due to  reports of increased LE weakness. Stiff knee pattern  note with 2 UE assist side stepping introduced hurdles for improved R LE clearance  however pt apprehensive with performance for fear of falling and requested not to perform.      Plan:   Cont to increase LE strength to progress standing activities.    Treatment :   Therapeutic exercise (29506):   Nustep level 5 5 minutes   Standing hamstring /hip flexor 2 x30   TA lower abd SLR x10   Hooylying abduction GTB x20   Supine Multifidus with Heel Press   SB DKTC x30   Supine bridge x15    Seated TB blue rows x20  Seated TA Y Tb pull aparts x15   Seated LAQ 4 # 2x10   Standing side stepping 30 seconds x2   Lateral stepping over hurdles 1 lap along rail     Current Problem:  1. 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  Follow Up In Physical Therapy              Pain:       5/10    Location:          Precautions: No recent

## 2024-09-24 ENCOUNTER — TREATMENT (OUTPATIENT)
Dept: PHYSICAL THERAPY | Facility: CLINIC | Age: 79
End: 2024-09-24
Payer: MEDICARE

## 2024-09-24 DIAGNOSIS — M48.061 SPINAL STENOSIS, LUMBAR REGION WITHOUT NEUROGENIC CLAUDICATION: ICD-10-CM

## 2024-09-24 PROCEDURE — 97110 THERAPEUTIC EXERCISES: CPT | Mod: GP | Performed by: GENERAL ACUTE CARE HOSPITAL

## 2024-09-24 NOTE — PROGRESS NOTES
Patient Name: Stew Rubin  MRN: [id number]  Today's Date: 9/24/2024     Current Problem:  1. 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  Follow Up In Physical Therapy          Visit 6/7    Subjective:  Change in pain/ pain level: 2/10  Change in function: continue to ambulate only 50 feet max. PT is not improving symptoms   Patient comments: improved bladder leakage since beginning new medication. Taking lasix and having to urinate >20 times, kept me awake all night    Objective: ambulation 15 feet at a time     Treatment:    Therapeutic exercise (75000):    Recumbent bike 8 min level 3   Stretches: HS hip flexor  Supine ball DK2C x20  PPT x20  Bridge- unable  SLR x20   Hook lying ball squeeze  Hook lying tband abd   ambulation    Assessment:  Herve was unable to complete any standing activities without significant pain or and LE fatigue. Wheeled to car in  due to legs not feeling like they will make it to the car. Unable to complete 6 MWT this date at patient request. Pt continues to be compliant with therapy but appears to receive no benefit and per patient report, symptoms a worsening.     Plan:  On visit before discharge. Patient does not appear to benefit from any activity of PT.

## 2024-09-27 ENCOUNTER — APPOINTMENT (OUTPATIENT)
Dept: PHYSICAL THERAPY | Facility: CLINIC | Age: 79
End: 2024-09-27
Payer: MEDICARE

## 2024-10-01 ENCOUNTER — PATIENT OUTREACH (OUTPATIENT)
Dept: CARE COORDINATION | Facility: CLINIC | Age: 79
End: 2024-10-01
Payer: MEDICARE

## 2024-10-01 ENCOUNTER — TELEPHONE (OUTPATIENT)
Dept: PRIMARY CARE | Facility: CLINIC | Age: 79
End: 2024-10-01
Payer: MEDICARE

## 2024-10-01 NOTE — TELEPHONE ENCOUNTER
----- Message from Tiki Moore sent at 10/1/2024 11:54 AM EDT -----  Regarding: TCM  Hello,        Can you please contact pt to schedule hosp  follow up within 14 days of discharge.  This patient was discharged from: [facility]   Discharge diagnosis:  TIARA (acute kidney injury)    Date of discharge:   9/28/24       Thank you

## 2024-10-01 NOTE — TELEPHONE ENCOUNTER
Called patient phone just continued to ring no option to leave message      Will try again  Thank you

## 2024-10-01 NOTE — PROGRESS NOTES
Discharge Facility: [facility]   Discharge Diagnosis: TIARA (acute kidney injury)   Admission Date: 9/25/24  Discharge Date: 9/28/24    PCP Appointment Date: MD ANGIE Anton tasked to office                                      Specialist Appointment Date: Urology Dr Dimas 11/18/24  Hospital Encounter and Summary Linked: Yes  See discharge assessment below for further details   Engagement  Call Start Time: 1118 (10/1/2024 11:49 AM)    Medications  Medications reviewed with patient/caregiver?: Yes (10/1/2024 11:49 AM)  Is the patient having any side effects they believe may be caused by any medication additions or changes?: No (10/1/2024 11:49 AM)  Does the patient have all medications ordered at discharge?: Yes (10/1/2024 11:49 AM)  Care Management Interventions: No intervention needed (10/1/2024 11:49 AM)  Prescription Comments: furosemide 40 mg tablet  Commonly known as: LASIX  Take 1 tablet by mouth once daily.  What changed: when to take this (10/1/2024 11:49 AM)  Is the patient taking all medications as directed (includes completed medication regime)?: Yes (10/1/2024 11:49 AM)  Care Management Interventions: Provided patient education (10/1/2024 11:49 AM)  Medication Comments: no issues obtaining medications (10/1/2024 11:49 AM)    Appointments  Does the patient have a primary care provider?: Yes (10/1/2024 11:49 AM)  Care Management Interventions: Educated patient on importance of making appointment; Advised patient to make appointment (10/1/2024 11:49 AM)  Has the patient kept scheduled appointments due by today?: Yes (10/1/2024 11:49 AM)  Care Management Interventions: Advised patient to keep appointment (10/1/2024 11:49 AM)    Self Management  What is the home health agency?: denies need (10/1/2024 11:49 AM)  What Durable Medical Equipment (DME) was ordered?: no new needs (10/1/2024 11:49 AM)    Patient Teaching  Does the patient have access to their discharge instructions?: Yes (10/1/2024  11:49 AM)  Care Management Interventions: Reviewed instructions with patient (10/1/2024 11:49 AM)  What is the patient's perception of their health status since discharge?: Returned to baseline/stable (10/1/2024 11:49 AM)  Is the patient/caregiver able to teach back the hierarchy of who to call/visit for symptoms/problems? PCP, Specialist, Home Health nurse, Urgent Care, ED, 911: Yes (10/1/2024 11:49 AM)  Patient/Caregiver Education Comments: CM discussed referencing discharge paperwork to follow detailed daily medication schedule (10/1/2024 11:49 AM)    Wrap Up  Wrap Up Additional Comments: This CM spoke with patient via phone. Successful transition of care outreach complete. Pt reports doing well at home since discharge. New meds reviewed. Pt aware of change made to his Lasix. Pt denies shortness of breath. Pt reports he needs one more PT session so he can have a CT of his back done and reports he is cleared for his last session. Patient denies any further discharge questions/needs at this time. Emphasized that Follow up is needed after discharge to review the hospital recommendations, assess your response to your treatment.  Pt aware of my availability for non-emergent concerns. Contact info provided to patient. (10/1/2024 11:49 AM)

## 2024-10-09 ENCOUNTER — APPOINTMENT (OUTPATIENT)
Dept: PRIMARY CARE | Facility: CLINIC | Age: 79
End: 2024-10-09
Payer: MEDICARE

## 2024-10-09 VITALS
OXYGEN SATURATION: 96 % | WEIGHT: 239 LBS | HEIGHT: 72 IN | DIASTOLIC BLOOD PRESSURE: 70 MMHG | TEMPERATURE: 97.7 F | SYSTOLIC BLOOD PRESSURE: 118 MMHG | HEART RATE: 64 BPM | BODY MASS INDEX: 32.37 KG/M2

## 2024-10-09 DIAGNOSIS — I25.10 CORONARY ARTERY DISEASE INVOLVING NATIVE CORONARY ARTERY OF NATIVE HEART WITHOUT ANGINA PECTORIS: ICD-10-CM

## 2024-10-09 DIAGNOSIS — M54.50 LUMBAR PAIN: ICD-10-CM

## 2024-10-09 DIAGNOSIS — Z09 HOSPITAL DISCHARGE FOLLOW-UP: ICD-10-CM

## 2024-10-09 DIAGNOSIS — M25.471 RIGHT ANKLE SWELLING: ICD-10-CM

## 2024-10-09 DIAGNOSIS — I10 HYPERTENSION, UNSPECIFIED TYPE: ICD-10-CM

## 2024-10-09 DIAGNOSIS — E11.40 CONTROLLED TYPE 2 DIABETES MELLITUS WITH DIABETIC NEUROPATHY, WITHOUT LONG-TERM CURRENT USE OF INSULIN: ICD-10-CM

## 2024-10-09 DIAGNOSIS — E78.2 MIXED HYPERLIPIDEMIA: ICD-10-CM

## 2024-10-09 DIAGNOSIS — Z12.11 ENCOUNTER FOR SCREENING COLONOSCOPY: ICD-10-CM

## 2024-10-09 DIAGNOSIS — M10.00 IDIOPATHIC GOUT, UNSPECIFIED CHRONICITY, UNSPECIFIED SITE: ICD-10-CM

## 2024-10-09 DIAGNOSIS — N17.9 AKI (ACUTE KIDNEY INJURY) (CMS-HCC): Primary | ICD-10-CM

## 2024-10-09 DIAGNOSIS — E78.00 HYPERCHOLESTEROLEMIA: ICD-10-CM

## 2024-10-09 DIAGNOSIS — R06.09 DOE (DYSPNEA ON EXERTION): ICD-10-CM

## 2024-10-09 PROCEDURE — 3074F SYST BP LT 130 MM HG: CPT | Performed by: FAMILY MEDICINE

## 2024-10-09 PROCEDURE — 99213 OFFICE O/P EST LOW 20 MIN: CPT | Performed by: FAMILY MEDICINE

## 2024-10-09 PROCEDURE — 1123F ACP DISCUSS/DSCN MKR DOCD: CPT | Performed by: FAMILY MEDICINE

## 2024-10-09 PROCEDURE — 3078F DIAST BP <80 MM HG: CPT | Performed by: FAMILY MEDICINE

## 2024-10-09 RX ORDER — EZETIMIBE 10 MG/1
TABLET ORAL
Qty: 90 TABLET | Refills: 0 | Status: SHIPPED | OUTPATIENT
Start: 2024-10-09

## 2024-10-09 RX ORDER — PREDNISONE 10 MG/1
TABLET ORAL
Qty: 51 TABLET | Refills: 0 | Status: SHIPPED | OUTPATIENT
Start: 2024-10-09

## 2024-10-09 ASSESSMENT — PATIENT HEALTH QUESTIONNAIRE - PHQ9
2. FEELING DOWN, DEPRESSED OR HOPELESS: NOT AT ALL
SUM OF ALL RESPONSES TO PHQ9 QUESTIONS 1 AND 2: 0
SUM OF ALL RESPONSES TO PHQ9 QUESTIONS 1 AND 2: 0
2. FEELING DOWN, DEPRESSED OR HOPELESS: NOT AT ALL
1. LITTLE INTEREST OR PLEASURE IN DOING THINGS: NOT AT ALL
1. LITTLE INTEREST OR PLEASURE IN DOING THINGS: NOT AT ALL

## 2024-10-09 NOTE — PROGRESS NOTES
Subjective   Patient ID: Stew Rubin is a 79 y.o. male who presents for No chief complaint on file..    [facility]    Hospital follow up-TCM COMPLETE   Discharge Facility: [facility]   Discharge Diagnosis: TIARA (acute kidney injury)   Admission Date: 9/25/24  Discharge Date: 9/28/24  Call made within two days: No  Number of days since discharge: 12  Patient had BW, Renal US, Chest X-ray.    Patient was prescribed    Patient was advised to follow up with cardiologist says he had too much fluid in his body and take lasix, nothing happened in two days doctor told him one more day and when he woke up the following day he was weak and fatigue and could not get up out of bed on his own and went to er, was told his HR was 30 and potassium levels were undetectable along with creation was low so they admitted him. He stayed for 4 days and left feeling better after IV Fluids  Schedule for an ablasion for his hearts rhythm, says he is out of A FIB  Says he is due for a stress test     Would like ring finger on right hand to be looked at   Review of Systems  Constitutional:  no chills, no fever and no night sweats.  Eyes: no blurred vision and no eyesight problems.  ENT: no hearing loss, no nasal congestion, no hoarseness and no sore throat.  Neck: no mass (es) and no swelling.  Cardiovascular: no chest pain, no intermittent leg claudication, no lower extremity edema, no palpitation and no syncope.  Respiratory: no cough, no shortness of breath during exertion, no shortness of breath at rest and no wheezing.  Gastrointestinal: no abdominal pain, no blood in stools, no constipation, no diarrhea, no melena, no nausea, no rectal pain and no vomiting.  Genitourinary: no dysuria, no change in urinary frequency, no urinary hesitancy and no feelings of urinary urgency.  Musculoskeletal: no arthralgias, no back pain and no myalgias.  Integumentary: no new skin lesions and no rashes.  Neurological: no difficulty walking, no headache, no  limb weakness, no numbness and no tingling.  Psychiatric/Behavioral: no anxiety, no depression, no anhedonia and no substance use disorders.  Endocrine: no recent weight gain and no recent weight loss.  Hematologic/Lymphatic: no tendency for easy bruising and no swollen glands    Objective   Physical Exam  Patient in for follow-up hospitalization for hypotension dehydration acute kidney injury was seeing nurse practitioner at the [facility] who started him on Lasix high dose he was on it for few days was not feeling well supposedly started on it because of the nurse pressures at practitioner thought he had excess fluid on board patient states his ankles were thin no shortness of breath show not sure why she wanted to do that after the first few days was not feeling well she wanted to try 1 more day following morning could not get out of bed weak fatigued was taken to the emergency room by his wife admitted with dehydration acute kidney injury hypokalemia.  Got IV fluids and potassium feels much better now.  Due for stress test colonoscopy and needs referral back to physical therapy for his back.  Lungs are clear cardiac and abdominal exams benign no peripheral edema.  There were no vitals taken for this visit.    Lab Results   Component Value Date    WBC 6.2 02/23/2023    HGB 13.8 02/23/2023    HCT 40.8 (L) 02/23/2023    MCV 99 02/23/2023     02/23/2023       Assessment/Plan plan refer to physical therapy get colonoscopy done in the nuclear stress test due to the dyspnea on exertion and chest discomfort he was post to get this done before all of this happened.  Follow-up only have the results  Problem List Items Addressed This Visit    None

## 2024-10-16 ENCOUNTER — PATIENT OUTREACH (OUTPATIENT)
Dept: CARE COORDINATION | Facility: CLINIC | Age: 79
End: 2024-10-16
Payer: MEDICARE

## 2024-10-18 ENCOUNTER — HOSPITAL ENCOUNTER (OUTPATIENT)
Dept: CARDIOLOGY | Facility: HOSPITAL | Age: 79
Discharge: HOME | End: 2024-10-18
Payer: MEDICARE

## 2024-10-18 ENCOUNTER — HOSPITAL ENCOUNTER (OUTPATIENT)
Dept: RADIOLOGY | Facility: HOSPITAL | Age: 79
Discharge: HOME | End: 2024-10-18
Payer: MEDICARE

## 2024-10-18 DIAGNOSIS — R06.09 DOE (DYSPNEA ON EXERTION): ICD-10-CM

## 2024-10-18 DIAGNOSIS — I25.10 CORONARY ARTERY DISEASE INVOLVING NATIVE CORONARY ARTERY OF NATIVE HEART WITHOUT ANGINA PECTORIS: ICD-10-CM

## 2024-10-18 PROCEDURE — 93016 CV STRESS TEST SUPVJ ONLY: CPT | Performed by: INTERNAL MEDICINE

## 2024-10-18 PROCEDURE — 78452 HT MUSCLE IMAGE SPECT MULT: CPT

## 2024-10-18 PROCEDURE — 93017 CV STRESS TEST TRACING ONLY: CPT

## 2024-10-18 PROCEDURE — A9502 TC99M TETROFOSMIN: HCPCS | Performed by: FAMILY MEDICINE

## 2024-10-18 PROCEDURE — 93018 CV STRESS TEST I&R ONLY: CPT | Performed by: INTERNAL MEDICINE

## 2024-10-18 PROCEDURE — 3430000001 HC RX 343 DIAGNOSTIC RADIOPHARMACEUTICALS: Performed by: FAMILY MEDICINE

## 2024-10-21 ENCOUNTER — PATIENT OUTREACH (OUTPATIENT)
Dept: PRIMARY CARE | Facility: CLINIC | Age: 79
End: 2024-10-21
Payer: MEDICARE

## 2024-10-21 ENCOUNTER — TELEPHONE (OUTPATIENT)
Dept: PRIMARY CARE | Facility: CLINIC | Age: 79
End: 2024-10-21
Payer: MEDICARE

## 2024-10-21 ENCOUNTER — TREATMENT (OUTPATIENT)
Dept: PHYSICAL THERAPY | Facility: CLINIC | Age: 79
End: 2024-10-21
Payer: MEDICARE

## 2024-10-21 DIAGNOSIS — G62.9 NEUROPATHY: ICD-10-CM

## 2024-10-21 DIAGNOSIS — M48.061 SPINAL STENOSIS, LUMBAR REGION WITHOUT NEUROGENIC CLAUDICATION: ICD-10-CM

## 2024-10-21 PROCEDURE — 97110 THERAPEUTIC EXERCISES: CPT | Mod: GP | Performed by: GENERAL ACUTE CARE HOSPITAL

## 2024-10-21 NOTE — TELEPHONE ENCOUNTER
Tried to call patient to make him aware that disability placards are ready to  but phone just rings, no answer. Could not leave a message  [phone]

## 2024-10-21 NOTE — TELEPHONE ENCOUNTER
Garrett Ha MD  Do [id number] [facility] Clinical Support Staff28 minutes ago (10:39 AM)       Okay to renew disability placard

## 2024-10-21 NOTE — TELEPHONE ENCOUNTER
----- Message from Garrett Ha sent at 10/18/2024  3:41 PM EDT -----  Press test shows mild heart failure with an ejection fraction of 40%.  He needs to follow-up with cardiology

## 2024-10-21 NOTE — PROGRESS NOTES
Patient Name: Stew Rubin  MRN: [id number]  Today's Date: 10/21/2024  Time Calculation  Start Time: 1640  Stop Time: 1712  Time Calculation (min): 32 min  Current Problem:  1. 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 Able to ride stationary bike 25 minutes without SOB or LBP.   Patient comments: recently admitted to hospital for symptoms of severe fatigue and dehydration- found to have low heart rate (30 BPM) and low potasium. He returns this date for final PT appointment.     Objective:      Hip Flexion (R/L):  4/5  4 /5     Hip Abduction:   4/5  4 /5     Hip External Rot (Clamshell):   4/5   4/5     Hip Extension:   4/5   4/5               Knee Ext (R/L):   29#, 41#     Knee Flex (R/L):   33#, 39#     Ankle DF (R/L): 4+  /5, 4+ /5  Ambulation 50 feet before needing to sit.     Treatment:    Therapeutic exercise (69933):  assessment/discharge     Assessment:  Herve had 7 visits of PT to improve lower back core and Lower extremity strength. He has shown nom improvement with therapy in regards to strength. He demonstrates slightly decreased LE strength on this date. He demonstrates no improved ability to ambulate increased distances, and pain levels have not changed 2-3/10 pain levels. Requires seated rest breaks and is able to ambulate again, roughly 50 feet.       Plan:  Discharge at this time as he has to this point no improvement with PT, wished to be discharged at this time. To return to Dr Avila for further work up.

## 2024-10-21 NOTE — TELEPHONE ENCOUNTER
[employer] message sent to patient to let him know that disability placard (2 on one paper) is ready to be picked up

## 2024-10-22 ENCOUNTER — OFFICE VISIT (OUTPATIENT)
Dept: GASTROENTEROLOGY | Facility: CLINIC | Age: 79
End: 2024-10-22
Payer: MEDICARE

## 2024-10-22 DIAGNOSIS — D36.9 ADENOMATOUS POLYP: ICD-10-CM

## 2024-10-22 DIAGNOSIS — Z86.0100 HISTORY OF COLON POLYPS: ICD-10-CM

## 2024-10-22 DIAGNOSIS — R19.4 ALTERED BOWEL HABITS: Primary | ICD-10-CM

## 2024-10-22 PROCEDURE — 1123F ACP DISCUSS/DSCN MKR DOCD: CPT | Performed by: NURSE PRACTITIONER

## 2024-10-22 PROCEDURE — 99204 OFFICE O/P NEW MOD 45 MIN: CPT | Performed by: NURSE PRACTITIONER

## 2024-10-22 RX ORDER — WHEAT DEXTRIN 3 G/4 G
POWDER (GRAM) ORAL
Qty: 248 G | Refills: 1 | Status: SHIPPED | OUTPATIENT
Start: 2024-10-22

## 2024-10-22 NOTE — PATIENT INSTRUCTIONS
You will be scheduled for a colonoscopy.  Please read all of the instructions 7 days before your colonoscopy.  YOU are on a blood thinner you will need to contact your prescribing physician to find out if it is OK to stop these medications. If not, please contact us.    You will need to take ONLY clear liquids the ENTIRE day before your procedure. These include (clear fruit juices, soda, Gatorade, broth, jello and coffee/tea) Avoid red and purple drinks. No cream or milk in the coffee.  You will need to take a bowel preparation.  You will also need a .      To schedule a procedure please call [phone]

## 2024-10-22 NOTE — PROGRESS NOTES
Subjective   Patient ID: Stew Rubin is a 79 y.o. male who presents for Colonoscopy.PMH: CAD s/p CABG  , BPH, Type II DM, HTN,, HLD, atrial fibrillation s/p ablation (on eliquis) , sleep apnea (CPAP)    Long h/o diarrhea, more episodes recently. He is having diarrhea a few times/week and will have one BM of diarrhea.   He denies rectal bleeding, abdominal pain, weight loss, vomiting,   He was dieting and lost weight     Colonoscopy (2018- multiple polyps Tubular adenomas-recommendation to repeat in 3 years    Review of Systems   All other systems reviewed and are negative.      Objective   Physical Exam  Neurological:      Mental Status: He is alert.         Assessment/Plan   Diagnoses and all orders for this visit:  Altered bowel habits  -     wheat dextrin (Benefiber Sugar Free, dextrin,) 3 gram/4 gram powder; Take 1 teaspoon daily with a full glass of water (8 ounces)  History of colon polyps  -     Colonoscopy Screening; High Risk Patient; personal history of adenomatous polyps; Future  Adenomatous polyp  -     Colonoscopy Screening; High Risk Patient; personal history of adenomatous polyps; Future     Stew Rubin is a 79 y.o. male with a PMH of HTN, HLD, CAD s/p CABG, PAD, atrial fibrillation s/p cardioversion  (now on eliquis) DMII, obesity, BPH, prostate cancer, MARICEL, headaches, gout who requests a VV today to discuss a colonoscopy. He was last seen in GI in May by Dr. Calle and at that time d/t c-spine fracture they wanted to hold off the colonoscopy. He did receive clearance. Discussed risks, benefits, diet, Eliquis and bowel prep.    CONNER Camacho-CNP 10/22/24 9:08 AM

## 2024-10-30 DIAGNOSIS — I10 HYPERTENSION, UNSPECIFIED TYPE: ICD-10-CM

## 2024-10-30 RX ORDER — METOPROLOL TARTRATE 50 MG/1
50 TABLET ORAL 2 TIMES DAILY
Qty: 180 TABLET | Refills: 1 | Status: SHIPPED | OUTPATIENT
Start: 2024-10-30

## 2024-11-04 DIAGNOSIS — R41.3 MEMORY LOSS: ICD-10-CM

## 2024-11-04 RX ORDER — MEMANTINE HYDROCHLORIDE 21 MG/1
21 CAPSULE, EXTENDED RELEASE ORAL DAILY
Qty: 90 CAPSULE | Refills: 1 | Status: SHIPPED | OUTPATIENT
Start: 2024-11-04

## 2024-11-04 NOTE — TELEPHONE ENCOUNTER
MEDICATION PENDED  Rx Refill Request Telephone Encounter    Name:  Stew Rubin  : 1945     Medication Name:  memantine (Namenda) 21 mg capsule,sprinkle,ER 24hr [[id number]]    Order Details  Dose: 21 mg Route: oral Frequency: Daily   Dispense Quantity: 90 capsule Refills: 0          Sig: Take 1 capsule (21 mg) by mouth once daily.       ALLERGIES:   see ;list    Specific Pharmacy location:  [id number] [place] [address]     Date of last appointment:  10/9/24  Date of next appointment:      Best number to reach patient:  [phone]

## 2024-11-18 ENCOUNTER — APPOINTMENT (OUTPATIENT)
Dept: UROLOGY | Facility: CLINIC | Age: 79
End: 2024-11-18
Payer: MEDICARE

## 2024-11-18 VITALS — DIASTOLIC BLOOD PRESSURE: 73 MMHG | HEART RATE: 89 BPM | SYSTOLIC BLOOD PRESSURE: 117 MMHG | TEMPERATURE: 97.5 F

## 2024-11-18 DIAGNOSIS — C61 PROSTATE CANCER (MULTI): ICD-10-CM

## 2024-11-18 DIAGNOSIS — R32 URINARY INCONTINENCE, UNSPECIFIED TYPE: Primary | ICD-10-CM

## 2024-11-18 PROCEDURE — 1123F ACP DISCUSS/DSCN MKR DOCD: CPT | Performed by: NURSE PRACTITIONER

## 2024-11-18 PROCEDURE — 3074F SYST BP LT 130 MM HG: CPT | Performed by: NURSE PRACTITIONER

## 2024-11-18 PROCEDURE — 99213 OFFICE O/P EST LOW 20 MIN: CPT | Performed by: NURSE PRACTITIONER

## 2024-11-18 PROCEDURE — 3078F DIAST BP <80 MM HG: CPT | Performed by: NURSE PRACTITIONER

## 2024-11-18 RX ORDER — VIBEGRON 75 MG/1
75 TABLET, FILM COATED ORAL DAILY
Qty: 90 TABLET | Refills: 3 | Status: SHIPPED | OUTPATIENT
Start: 2024-11-18 | End: 2025-11-18

## 2024-11-18 NOTE — PROGRESS NOTES
Subjective   Patient ID: Stew Rubin is a 79 y.o. male who presents for Med Follow Up .  Presents for urinary leakage follow up. He started on Gemtesa 2 months ago and is very happy with the results. He has reported no urinary leakage since starting the medication. He is still attempting to urinate every couple of hours proactively to avoid leakage. He also gets up twice a night to urinate but this is not bothersome to him.      He does not wish to continue with PSA follow up for history of prostate CA.           Review of Systems   All other systems reviewed and are negative.      Objective   Physical Exam  Vitals reviewed.     Alert and oriented x3  Moist mucous membranes  Breathes easily on room air  Abdomen soft, nondistended  No edema  No scleral icterus  No focal neurological deficits  Appears stated age, no acute distress      Assessment/Plan   Diagnoses and all orders for this visit:  Urinary incontinence, unspecified type  -     vibegron (Gemtesa) 75 mg tablet; Take 1 tablet (75 mg) by mouth once daily.  Prostate cancer (Multi)    Plan to continue Gemtesa at this time as he is pleased. Declining further PSA surveillance. Plan for 6 month follow up or sooner if needed. Patient is in agreement with plan.          Jessi Dimas, CONNER-CNP 11/18/24 1:00 PM

## 2024-11-18 NOTE — PROGRESS NOTES
Subjective   Patient ID: Stew Rubin is a 79 y.o. male who presents for Med Follow Up .  Presents for urinary leakage follow up. He started on Gemtesa 2 months ago and is very happy with the results. He has reported no urinary leakage since starting the medication. He is still attempting to urinate every couple of hours proactively to avoid leakage. He also gets up twice a night to urinate but this is not bothersome to him.     He does not wish to continue with PSA follow up.          Review of Systems    Objective   Physical Exam    Assessment/Plan            Lizzy Josue RN 11/18/24 12:20 PM

## 2024-11-20 DIAGNOSIS — G62.9 NEUROPATHY: ICD-10-CM

## 2024-11-20 RX ORDER — GABAPENTIN 300 MG/1
300 CAPSULE ORAL DAILY
Qty: 90 CAPSULE | Refills: 0 | Status: SHIPPED | OUTPATIENT
Start: 2024-11-20 | End: 2025-02-18

## 2024-11-20 NOTE — TELEPHONE ENCOUNTER
Medication pended--- on  D/Cd by Jessi Dimas per chart... please advise  Rx Refill Request Telephone Encounter    Name:  Stew Rubin  : 1945     Medication Name:  GABAPENTIN  Dose (Optional):    300 MG  Quantity (Optional):    90  Directions (Optional):   1 CAPSULE DAILY    ALLERGIES:   ON FILE    Specific Pharmacy location:  [facility]    Date of last appointment:  10/9/24  Date of next appointment:  24    Best number to reach patient:  [phone]

## 2024-11-25 ENCOUNTER — APPOINTMENT (OUTPATIENT)
Dept: PRIMARY CARE | Facility: CLINIC | Age: 79
End: 2024-11-25
Payer: MEDICARE

## 2024-11-25 VITALS
HEIGHT: 72 IN | DIASTOLIC BLOOD PRESSURE: 78 MMHG | BODY MASS INDEX: 30.34 KG/M2 | WEIGHT: 224 LBS | SYSTOLIC BLOOD PRESSURE: 120 MMHG

## 2024-11-25 DIAGNOSIS — I10 HYPERTENSION, UNSPECIFIED TYPE: ICD-10-CM

## 2024-11-25 DIAGNOSIS — E78.00 HYPERCHOLESTEROLEMIA: ICD-10-CM

## 2024-11-25 DIAGNOSIS — E11.40 CONTROLLED TYPE 2 DIABETES MELLITUS WITH DIABETIC NEUROPATHY, WITHOUT LONG-TERM CURRENT USE OF INSULIN: ICD-10-CM

## 2024-11-25 DIAGNOSIS — M79.671 BILATERAL FOOT PAIN: ICD-10-CM

## 2024-11-25 DIAGNOSIS — M79.672 BILATERAL FOOT PAIN: ICD-10-CM

## 2024-11-25 PROCEDURE — 3078F DIAST BP <80 MM HG: CPT | Performed by: FAMILY MEDICINE

## 2024-11-25 PROCEDURE — 1123F ACP DISCUSS/DSCN MKR DOCD: CPT | Performed by: FAMILY MEDICINE

## 2024-11-25 PROCEDURE — 3074F SYST BP LT 130 MM HG: CPT | Performed by: FAMILY MEDICINE

## 2024-11-25 PROCEDURE — 1159F MED LIST DOCD IN RCRD: CPT | Performed by: FAMILY MEDICINE

## 2024-11-25 PROCEDURE — 99213 OFFICE O/P EST LOW 20 MIN: CPT | Performed by: FAMILY MEDICINE

## 2024-11-25 NOTE — PROGRESS NOTES
Subjective   Patient ID: Stew Rubin is a 79 y.o. male who presents for parifieral neuropathy and Atrial Fibrillation.  HPI    Patient presents in office today for bilateral foot pain. Ongoing x 2 weeks ago. Patient admits that he had carpule tunnel surgery 10 years ago and read that a side effect of this is parifieral neuropathy. Thinking this is a flair up. Patient was told he did not have good blance about 10 years ago. Patient admits that this has been getting worse. States that he can not bend over to pick anything up off the floor. States that this is a side effect of parifieral neuropathy. Patient is prescribed gabapentin.     Patient admits that a month and half ago he was cleared of A-Fib by the clinic.     Patient admits that he would like to go a head and schedule his colonoscopy. Patient admits that he meet with a nurse about 5 weeks ago and has not heard anything since.     Review of Systems  Constitutional:  no chills, no fever and no night sweats.  Eyes: no blurred vision and no eyesight problems.  ENT: no hearing loss, no nasal congestion, no hoarseness and no sore throat.  Neck: no mass (es) and no swelling.  Cardiovascular: no chest pain, no intermittent leg claudication, no lower extremity edema, no palpitation and no syncope.  Respiratory: no cough, no shortness of breath during exertion, no shortness of breath at rest and no wheezing.  Gastrointestinal: no abdominal pain, no blood in stools, no constipation, no diarrhea, no melena, no nausea, no rectal pain and no vomiting.  Genitourinary: no dysuria, no change in urinary frequency, no urinary hesitancy and no feelings of urinary urgency.  Musculoskeletal: no arthralgias, no back pain and no myalgias.  Integumentary: no new skin lesions and no rashes.  Neurological: no difficulty walking, no headache, no limb weakness, no numbness and no tingling.  Psychiatric/Behavioral: no anxiety, no depression, no anhedonia and no substance use  disorders.  Endocrine: no recent weight gain and no recent weight loss.  Hematologic/Lymphatic: no tendency for easy bruising and no swollen glands    Objective   Physical Exam  Patient in for follow-up had a fall a couple of lacerations on his forehead they have since healed.  He has been changed from baby aspirin a day to Eliquis he is follows up with his cardiologist not sure why that happens supposedly no longer in A-fib.  Left hand fourth finger swollen and tender less painful less tender the fall was about 4 weeks ago now no obvious fracture MCP joint or in the palm of the hand.  Blood pressure stable.  Chronic edema stable.  Patient complaints of some balance difficulties offered him physical therapy to work on strengthening and balance he does not want to do that yet.  Clear rhinorrhea he is taking 1 Claritin a day advised he can try to a day let us know if is not better.  Mild flareup of his chronic peripheral neuropathy back on his gabapentin seems to be helping.  Obese male in no acute distress lungs clear cardiac and abdominal exams are benign.  Chronic pitting edema bilaterally to mid shin.  Plan continue current treatment course  There were no vitals taken for this visit.    Lab Results   Component Value Date    WBC 6.2 02/23/2023    HGB 13.8 02/23/2023    HCT 40.8 (L) 02/23/2023    MCV 99 02/23/2023     02/23/2023       Assessment/Plan follow-up with him in 3 months or as needed  Problem List Items Addressed This Visit       Controlled diabetes mellitus with diabetic neuropathy, without long-term current use of insulin    Hypercholesterolemia     Other Visit Diagnoses       Bilateral foot pain        Hypertension, unspecified type

## 2024-11-26 ENCOUNTER — APPOINTMENT (OUTPATIENT)
Dept: PRIMARY CARE | Facility: CLINIC | Age: 79
End: 2024-11-26
Payer: MEDICARE

## 2024-12-10 DIAGNOSIS — Z12.11 SCREENING FOR COLON CANCER: Primary | ICD-10-CM

## 2024-12-10 DIAGNOSIS — F02.80 ALZHEIMER DISEASE (MULTI): ICD-10-CM

## 2024-12-10 DIAGNOSIS — G30.9 ALZHEIMER DISEASE (MULTI): ICD-10-CM

## 2024-12-10 DIAGNOSIS — E78.2 MIXED HYPERLIPIDEMIA: ICD-10-CM

## 2024-12-10 RX ORDER — PRAVASTATIN SODIUM 20 MG/1
20 TABLET ORAL NIGHTLY
Qty: 90 TABLET | Refills: 1 | Status: SHIPPED | OUTPATIENT
Start: 2024-12-10

## 2024-12-10 RX ORDER — DONEPEZIL HYDROCHLORIDE 10 MG/1
10 TABLET, FILM COATED ORAL NIGHTLY
Qty: 90 TABLET | Refills: 1 | Status: SHIPPED | OUTPATIENT
Start: 2024-12-10

## 2024-12-10 NOTE — TELEPHONE ENCOUNTER
Patient is calling to request 2 RX refills     Rx Refill Request Telephone Encounter    Name:  Stew Rubin  : 1945     Medication Name:  pravastatin  Dose (Optional):    20 mg  Quantity (Optional):    90  Directions (Optional):   Take 1 tablet (20 mg) by mouth once daily at bedtime    Medication Name:  donepezil  Dose (Optional):    10 mg  Quantity (Optional):    90  Directions (Optional):   Take 1 tablet (10 mg) by mouth once daily at bedtime     ALLERGIES:   see list     Specific Pharmacy location:  [facility]     Date of last appointment:  24  Date of next appointment:  none     Best number to reach patient:  [phone]

## 2024-12-12 ENCOUNTER — APPOINTMENT (OUTPATIENT)
Dept: PRIMARY CARE | Facility: CLINIC | Age: 79
End: 2024-12-12
Payer: MEDICARE

## 2024-12-12 RX ORDER — POLYETHYLENE GLYCOL 3350, SODIUM SULFATE ANHYDROUS, SODIUM BICARBONATE, SODIUM CHLORIDE, POTASSIUM CHLORIDE 236; 22.74; 6.74; 5.86; 2.97 G/4L; G/4L; G/4L; G/4L; G/4L
4 POWDER, FOR SOLUTION ORAL ONCE
Qty: 4000 ML | Refills: 0 | Status: SHIPPED | OUTPATIENT
Start: 2024-12-12 | End: 2024-12-12

## 2024-12-26 DIAGNOSIS — E87.6 HYPOKALEMIA: ICD-10-CM

## 2024-12-26 NOTE — H&P (VIEW-ONLY)
Subjective   Patient ID: Stew Rubin is a 79 y.o. male who presents for Shoulder Pain and Hand Pain.  HPI    Patient presents in the office today with complaints of bilateral  shoulder and hands pain. Patient states pain started in both hands and than both shoulder . Pt states that there is swelling in hands. Pt is having a hard time bending his finger. Ongoing X 3 month . Has tried Arthritis pain relieving gel minimal relief .      Patient declined  the flu vaccine today.    No other concern/question   Review of Systems  Constitutional:  no chills, no fever and no night sweats.  Eyes: no blurred vision and no eyesight problems.  ENT: no hearing loss, no nasal congestion, no hoarseness and no sore throat.  Neck: no mass (es) and no swelling.  Cardiovascular: no chest pain, no intermittent leg claudication, no lower extremity edema, no palpitation and no syncope.  Respiratory: no cough, no shortness of breath during exertion, no shortness of breath at rest and no wheezing.  Gastrointestinal: no abdominal pain, no blood in stools, no constipation, no diarrhea, no melena, no nausea, no rectal pain and no vomiting.  Genitourinary: no dysuria, no change in urinary frequency, no urinary hesitancy and no feelings of urinary urgency.  Musculoskeletal: no arthralgias, no back pain and no myalgias.  Integumentary: no new skin lesions and no rashes.  Neurological: no difficulty walking, no headache, no limb weakness, no numbness and no tingling.  Psychiatric/Behavioral: no anxiety, no depression, no anhedonia and no substance use disorders.  Endocrine: no recent weight gain and no recent weight loss.  Hematologic/Lymphatic: no tendency for easy bruising and no swollen glands    Objective   Physical Exam  Patient in with bilateral shoulder hand pain intermittent swelling of the fingers currently right hand fourth finger PIP joint swollen and tender may be having gout flares he would like to be evaluated by rheumatology.  He  took some prednisone that he had leftover from his last gout flareup and it helped but not swelling again no evidence of infection soft tissue swelling and redness pain consistent with acute gout.  He has been using some Voltaren gel and that it has been helping.  Patient is also due for blood work.  /76 (BP Location: Right arm, Patient Position: Sitting, BP Cuff Size: Adult)   Pulse 74   Temp 36.2 °C (97.2 °F) (Temporal)   Resp 18   Ht 1.829 m (6')   Wt 110 kg (243 lb)   SpO2 98%   BMI 32.96 kg/m²     Lab Results   Component Value Date    WBC 6.2 02/23/2023    HGB 13.8 02/23/2023    HCT 40.8 (L) 02/23/2023    MCV 99 02/23/2023     02/23/2023       Assessment/Plan plan bilateral hand x-ray refer to rheumatology get blood drawn follow-up when we have the test results  Problem List Items Addressed This Visit          Cardiac and Vasculature    Essential hypertension    Relevant Orders    CBC and Auto Differential    Comprehensive Metabolic Panel    Hypercholesterolemia    Mixed hyperlipidemia    Relevant Orders    Lipid Panel       Endocrine/Metabolic    Controlled diabetes mellitus with diabetic neuropathy, without long-term current use of insulin    Relevant Orders    Hemoglobin A1C    BMI 32.0-32.9,adult    Class 1 obesity due to excess calories with serious comorbidity and body mass index (BMI) of 32.0 to 32.9 in adult       Genitourinary and Reproductive    Benign prostatic hyperplasia    Relevant Orders    Prostate Specific Antigen, Screen       Musculoskeletal and Injuries    Shoulder pain - Primary    Relevant Orders    Referral to Rheumatology     Other Visit Diagnoses       Bilateral hand swelling        Relevant Orders    XR hand 3+ views bilateral    Referral to Rheumatology

## 2024-12-26 NOTE — TELEPHONE ENCOUNTER
MEDICATION PENDED  Rx Refill Request Telephone Encounter    Name:  Stew Rubin  : 1945     potassium chloride CR (Klor-Con M20) 20 mEq ER tablet [[id number]]    Order Details  Dose: 20 mEq Route: oral Frequency: Daily   Dispense Quantity: 90 tablet Refills: 0          Sig: Take 1 tablet (20 mEq) by mouth once daily. Do not crush or chew.       ALLERGIES:   SEE LIST    Specific Pharmacy location:  [facility]    Date of last appointment:  2024  Date of next appointment:  NONE    Best number to reach patient: [phone]

## 2024-12-27 ENCOUNTER — PATIENT OUTREACH (OUTPATIENT)
Dept: PRIMARY CARE | Facility: CLINIC | Age: 79
End: 2024-12-27
Payer: MEDICARE

## 2024-12-27 RX ORDER — POTASSIUM CHLORIDE 20 MEQ/1
20 TABLET, EXTENDED RELEASE ORAL DAILY
Qty: 90 TABLET | Refills: 0 | Status: SHIPPED | OUTPATIENT
Start: 2024-12-27

## 2024-12-27 NOTE — PROGRESS NOTES
Multiple attempts to call patient but phone line rang busy. Final call. Final call back to assess needs post discharge.

## 2024-12-30 ENCOUNTER — APPOINTMENT (OUTPATIENT)
Dept: PRIMARY CARE | Facility: CLINIC | Age: 79
End: 2024-12-30
Payer: MEDICARE

## 2024-12-30 VITALS
HEART RATE: 74 BPM | HEIGHT: 72 IN | BODY MASS INDEX: 32.91 KG/M2 | DIASTOLIC BLOOD PRESSURE: 76 MMHG | SYSTOLIC BLOOD PRESSURE: 124 MMHG | RESPIRATION RATE: 18 BRPM | TEMPERATURE: 97.2 F | OXYGEN SATURATION: 98 % | WEIGHT: 243 LBS

## 2024-12-30 DIAGNOSIS — I10 ESSENTIAL HYPERTENSION: ICD-10-CM

## 2024-12-30 DIAGNOSIS — M79.89 BILATERAL HAND SWELLING: ICD-10-CM

## 2024-12-30 DIAGNOSIS — E78.2 MIXED HYPERLIPIDEMIA: ICD-10-CM

## 2024-12-30 DIAGNOSIS — M25.519 SHOULDER PAIN, UNSPECIFIED CHRONICITY, UNSPECIFIED LATERALITY: Primary | ICD-10-CM

## 2024-12-30 DIAGNOSIS — E11.40 CONTROLLED TYPE 2 DIABETES MELLITUS WITH DIABETIC NEUROPATHY, WITHOUT LONG-TERM CURRENT USE OF INSULIN: ICD-10-CM

## 2024-12-30 DIAGNOSIS — E66.811 CLASS 1 OBESITY DUE TO EXCESS CALORIES WITH SERIOUS COMORBIDITY AND BODY MASS INDEX (BMI) OF 32.0 TO 32.9 IN ADULT: ICD-10-CM

## 2024-12-30 DIAGNOSIS — E66.09 CLASS 1 OBESITY DUE TO EXCESS CALORIES WITH SERIOUS COMORBIDITY AND BODY MASS INDEX (BMI) OF 32.0 TO 32.9 IN ADULT: ICD-10-CM

## 2024-12-30 DIAGNOSIS — E78.00 HYPERCHOLESTEROLEMIA: ICD-10-CM

## 2024-12-30 DIAGNOSIS — N40.0 BENIGN PROSTATIC HYPERPLASIA WITHOUT LOWER URINARY TRACT SYMPTOMS: ICD-10-CM

## 2024-12-30 PROCEDURE — 99213 OFFICE O/P EST LOW 20 MIN: CPT | Performed by: FAMILY MEDICINE

## 2024-12-30 PROCEDURE — 1036F TOBACCO NON-USER: CPT | Performed by: FAMILY MEDICINE

## 2024-12-30 PROCEDURE — 1159F MED LIST DOCD IN RCRD: CPT | Performed by: FAMILY MEDICINE

## 2024-12-30 PROCEDURE — 1123F ACP DISCUSS/DSCN MKR DOCD: CPT | Performed by: FAMILY MEDICINE

## 2024-12-30 PROCEDURE — 3074F SYST BP LT 130 MM HG: CPT | Performed by: FAMILY MEDICINE

## 2024-12-30 PROCEDURE — 3078F DIAST BP <80 MM HG: CPT | Performed by: FAMILY MEDICINE

## 2024-12-31 ENCOUNTER — LAB (OUTPATIENT)
Dept: LAB | Facility: LAB | Age: 79
End: 2024-12-31
Payer: MEDICARE

## 2024-12-31 ENCOUNTER — HOSPITAL ENCOUNTER (OUTPATIENT)
Dept: RADIOLOGY | Facility: CLINIC | Age: 79
Discharge: HOME | End: 2024-12-31
Payer: MEDICARE

## 2024-12-31 DIAGNOSIS — E11.40 CONTROLLED TYPE 2 DIABETES MELLITUS WITH DIABETIC NEUROPATHY, WITHOUT LONG-TERM CURRENT USE OF INSULIN: ICD-10-CM

## 2024-12-31 DIAGNOSIS — E53.8 VITAMIN B 12 DEFICIENCY: ICD-10-CM

## 2024-12-31 DIAGNOSIS — N40.0 BENIGN PROSTATIC HYPERPLASIA WITHOUT LOWER URINARY TRACT SYMPTOMS: ICD-10-CM

## 2024-12-31 DIAGNOSIS — E78.2 MIXED HYPERLIPIDEMIA: ICD-10-CM

## 2024-12-31 DIAGNOSIS — D50.9 IRON DEFICIENCY ANEMIA, UNSPECIFIED IRON DEFICIENCY ANEMIA TYPE: ICD-10-CM

## 2024-12-31 DIAGNOSIS — M79.89 BILATERAL HAND SWELLING: ICD-10-CM

## 2024-12-31 DIAGNOSIS — I10 ESSENTIAL HYPERTENSION: ICD-10-CM

## 2024-12-31 LAB
ALBUMIN SERPL BCP-MCNC: 3.4 G/DL (ref 3.4–5)
ALP SERPL-CCNC: 82 U/L (ref 33–136)
ALT SERPL W P-5'-P-CCNC: 19 U/L (ref 10–52)
ANION GAP SERPL CALC-SCNC: 14 MMOL/L (ref 10–20)
AST SERPL W P-5'-P-CCNC: 20 U/L (ref 9–39)
BASOPHILS # BLD AUTO: 0.03 X10*3/UL (ref 0–0.1)
BASOPHILS NFR BLD AUTO: 0.3 %
BILIRUB SERPL-MCNC: 0.3 MG/DL (ref 0–1.2)
BUN SERPL-MCNC: 26 MG/DL (ref 6–23)
CALCIUM SERPL-MCNC: 9.3 MG/DL (ref 8.6–10.3)
CHLORIDE SERPL-SCNC: 103 MMOL/L (ref 98–107)
CHOLEST SERPL-MCNC: 129 MG/DL (ref 0–199)
CHOLESTEROL/HDL RATIO: 3.4
CO2 SERPL-SCNC: 29 MMOL/L (ref 21–32)
CREAT SERPL-MCNC: 1.13 MG/DL (ref 0.5–1.3)
EGFRCR SERPLBLD CKD-EPI 2021: 66 ML/MIN/1.73M*2
EOSINOPHIL # BLD AUTO: 0.02 X10*3/UL (ref 0–0.4)
EOSINOPHIL NFR BLD AUTO: 0.2 %
ERYTHROCYTE [DISTWIDTH] IN BLOOD BY AUTOMATED COUNT: 13.6 % (ref 11.5–14.5)
EST. AVERAGE GLUCOSE BLD GHB EST-MCNC: 143 MG/DL
GLUCOSE SERPL-MCNC: 160 MG/DL (ref 74–99)
HBA1C MFR BLD: 6.6 %
HCT VFR BLD AUTO: 32.9 % (ref 41–52)
HDLC SERPL-MCNC: 37.6 MG/DL
HGB BLD-MCNC: 11 G/DL (ref 13.5–17.5)
IMM GRANULOCYTES # BLD AUTO: 0.04 X10*3/UL (ref 0–0.5)
IMM GRANULOCYTES NFR BLD AUTO: 0.4 % (ref 0–0.9)
LDLC SERPL CALC-MCNC: 68 MG/DL
LYMPHOCYTES # BLD AUTO: 1.16 X10*3/UL (ref 0.8–3)
LYMPHOCYTES NFR BLD AUTO: 12.8 %
MCH RBC QN AUTO: 34 PG (ref 26–34)
MCHC RBC AUTO-ENTMCNC: 33.4 G/DL (ref 32–36)
MCV RBC AUTO: 102 FL (ref 80–100)
MONOCYTES # BLD AUTO: 0.64 X10*3/UL (ref 0.05–0.8)
MONOCYTES NFR BLD AUTO: 7.1 %
NEUTROPHILS # BLD AUTO: 7.18 X10*3/UL (ref 1.6–5.5)
NEUTROPHILS NFR BLD AUTO: 79.2 %
NON HDL CHOLESTEROL: 91 MG/DL (ref 0–149)
NRBC BLD-RTO: 0 /100 WBCS (ref 0–0)
PLATELET # BLD AUTO: 413 X10*3/UL (ref 150–450)
POTASSIUM SERPL-SCNC: 4.3 MMOL/L (ref 3.5–5.3)
PROT SERPL-MCNC: 6.3 G/DL (ref 6.4–8.2)
PSA SERPL-MCNC: 0.03 NG/ML
RBC # BLD AUTO: 3.24 X10*6/UL (ref 4.5–5.9)
SODIUM SERPL-SCNC: 142 MMOL/L (ref 136–145)
TRIGL SERPL-MCNC: 119 MG/DL (ref 0–149)
VLDL: 24 MG/DL (ref 0–40)
WBC # BLD AUTO: 9.1 X10*3/UL (ref 4.4–11.3)

## 2024-12-31 PROCEDURE — 84153 ASSAY OF PSA TOTAL: CPT

## 2024-12-31 PROCEDURE — 80053 COMPREHEN METABOLIC PANEL: CPT

## 2024-12-31 PROCEDURE — 73130 X-RAY EXAM OF HAND: CPT | Mod: 50

## 2024-12-31 PROCEDURE — 85025 COMPLETE CBC W/AUTO DIFF WBC: CPT

## 2024-12-31 PROCEDURE — 82607 VITAMIN B-12: CPT

## 2024-12-31 PROCEDURE — 83540 ASSAY OF IRON: CPT

## 2024-12-31 PROCEDURE — 80061 LIPID PANEL: CPT

## 2024-12-31 PROCEDURE — 73130 X-RAY EXAM OF HAND: CPT | Mod: BILATERAL PROCEDURE | Performed by: RADIOLOGY

## 2024-12-31 PROCEDURE — 82746 ASSAY OF FOLIC ACID SERUM: CPT

## 2025-01-02 ENCOUNTER — TELEPHONE (OUTPATIENT)
Dept: PRIMARY CARE | Facility: CLINIC | Age: 80
End: 2025-01-02
Payer: MEDICARE

## 2025-01-02 DIAGNOSIS — I10 ESSENTIAL HYPERTENSION: ICD-10-CM

## 2025-01-02 DIAGNOSIS — E53.8 VITAMIN B 12 DEFICIENCY: ICD-10-CM

## 2025-01-02 DIAGNOSIS — M25.471 RIGHT ANKLE SWELLING: ICD-10-CM

## 2025-01-02 DIAGNOSIS — M10.00 IDIOPATHIC GOUT, UNSPECIFIED CHRONICITY, UNSPECIFIED SITE: ICD-10-CM

## 2025-01-02 DIAGNOSIS — D50.9 IRON DEFICIENCY ANEMIA, UNSPECIFIED IRON DEFICIENCY ANEMIA TYPE: ICD-10-CM

## 2025-01-02 LAB
FOLATE SERPL-MCNC: 5.6 NG/ML
IRON SERPL-MCNC: 37 UG/DL (ref 35–150)
VIT B12 SERPL-MCNC: 330 PG/ML (ref 211–911)

## 2025-01-02 RX ORDER — PREDNISONE 10 MG/1
TABLET ORAL
Qty: 51 TABLET | Refills: 0 | Status: SHIPPED | OUTPATIENT
Start: 2025-01-02

## 2025-01-02 NOTE — TELEPHONE ENCOUNTER
----- Message from Garrett Ha sent at 1/2/2025  4:36 PM EST -----  X-ray consistent with psoriatic arthritis.  Await rheumatology

## 2025-01-02 NOTE — TELEPHONE ENCOUNTER
PATIENT IS CALLING - WOULD LIKE FOR YOU TO KNOW THAT HE HAS BEEN SCHEDULED FOR AN APPOINTMENT WITH A RHEUMATOLOGIST AT [facility] - DR. LUIS ZIEGLER  FOR TOMORROW - HE IS REQUESTING RECORDS BE COPIED AND WILL PICK THOSE UP TODAY (WE HAVE ALREADY DONE THAT - HE DID NOT UNDERSTAND THAT [facility] CAN VIEW RECORDS THROUGH OUR SYSTEM) - HE IS ALSO WONDERING IF THERE WAS IN SPECIFIC INFORMATION INFORMATION THAT YOU WOULD LIKE TO SHARE WITH THIS DOCTOR BECAUSE YOU HAVE BEEN HIS PCP FOR 30 + YEARS. ALSO HIS HAND SWELLING IS MUCH BETTER, as WELL as THE SHOULDER PAIN WAS BETTER.  PLEASE ADVISE.

## 2025-01-02 NOTE — TELEPHONE ENCOUNTER
I have no other specific information to transmit this position.  They can see all of his records now electronic medical record.         Called patient, left message for return call. Please see additional message for this patient.

## 2025-01-02 NOTE — TELEPHONE ENCOUNTER
Rx Refill Request Telephone Encounter    Name:  Stew Rubin  : 1945     Medication Name:  predniSONE (Deltasone) 10 mg tablet [[id number]]  DISCONTINUED    Order Details  Dose, Route, Frequency: As Directed   Dispense Quantity: 51 tablet Refills: 0          Sig: Take 60 mg a day for 6 days.  Then decrease by 10 mg a day until gone         ALLERGIES:   see list    Specific Pharmacy location:  [id number] [place] [address]     Date of last appointment:  24  Date of next appointment:      Best number to reach patient:  [phone]

## 2025-01-02 NOTE — TELEPHONE ENCOUNTER
----- Message from Garrett Ha sent at 1/2/2025  1:02 PM EST -----  Labs show in proved glucose control.  Slight anemia.  Await rheumatology evaluation.  May need B12 folate and iron levels drawn would recommend repeat CBC with differential in 2 months at that point draw iron B12 and folate

## 2025-01-02 NOTE — TELEPHONE ENCOUNTER
----- Message from Garrett Ha sent at 1/2/2025  1:02 PM EST -----  Labs show in proved glucose control.  Slight anemia.  Await rheumatology evaluation.  May need B12 folate and iron levels drawn would recommend repeat CBC with differential in 2 months at that point draw iron B12 and folate       LABS ORDERED AND ADDED TO PREVIOUS LABS EXCEPT CBC

## 2025-01-06 ENCOUNTER — TELEPHONE (OUTPATIENT)
Dept: PRIMARY CARE | Facility: CLINIC | Age: 80
End: 2025-01-06
Payer: MEDICARE

## 2025-01-06 NOTE — TELEPHONE ENCOUNTER
----- Message from Garrett Ha sent at 1/3/2025  5:51 PM EST -----  B12 iron and folate levels are normal.

## 2025-01-14 ENCOUNTER — ANESTHESIA (OUTPATIENT)
Dept: GASTROENTEROLOGY | Facility: HOSPITAL | Age: 80
End: 2025-01-14
Payer: MEDICARE

## 2025-01-14 ENCOUNTER — HOSPITAL ENCOUNTER (OUTPATIENT)
Dept: GASTROENTEROLOGY | Facility: HOSPITAL | Age: 80
Discharge: HOME | End: 2025-01-14
Payer: MEDICARE

## 2025-01-14 ENCOUNTER — ANESTHESIA EVENT (OUTPATIENT)
Dept: GASTROENTEROLOGY | Facility: HOSPITAL | Age: 80
End: 2025-01-14
Payer: MEDICARE

## 2025-01-14 VITALS
RESPIRATION RATE: 18 BRPM | TEMPERATURE: 96.8 F | SYSTOLIC BLOOD PRESSURE: 160 MMHG | DIASTOLIC BLOOD PRESSURE: 82 MMHG | OXYGEN SATURATION: 95 % | HEART RATE: 98 BPM

## 2025-01-14 DIAGNOSIS — Z86.0100 HISTORY OF COLON POLYPS: Primary | ICD-10-CM

## 2025-01-14 DIAGNOSIS — D36.9 ADENOMATOUS POLYP: ICD-10-CM

## 2025-01-14 PROCEDURE — 2720000007 HC OR 272 NO HCPCS

## 2025-01-14 PROCEDURE — A45385 PR COLONOSCOPY,REMV LESN,SNARE: Performed by: ANESTHESIOLOGY

## 2025-01-14 PROCEDURE — 2500000004 HC RX 250 GENERAL PHARMACY W/ HCPCS (ALT 636 FOR OP/ED): Performed by: NURSE ANESTHETIST, CERTIFIED REGISTERED

## 2025-01-14 PROCEDURE — 7100000010 HC PHASE TWO TIME - EACH INCREMENTAL 1 MINUTE

## 2025-01-14 PROCEDURE — 45385 COLONOSCOPY W/LESION REMOVAL: CPT | Performed by: INTERNAL MEDICINE

## 2025-01-14 PROCEDURE — 3700000002 HC GENERAL ANESTHESIA TIME - EACH INCREMENTAL 1 MINUTE

## 2025-01-14 PROCEDURE — 3700000001 HC GENERAL ANESTHESIA TIME - INITIAL BASE CHARGE

## 2025-01-14 PROCEDURE — 7100000009 HC PHASE TWO TIME - INITIAL BASE CHARGE

## 2025-01-14 PROCEDURE — A45385 PR COLONOSCOPY,REMV LESN,SNARE: Performed by: NURSE ANESTHETIST, CERTIFIED REGISTERED

## 2025-01-14 PROCEDURE — 2500000001 HC RX 250 WO HCPCS SELF ADMINISTERED DRUGS (ALT 637 FOR MEDICARE OP): Performed by: INTERNAL MEDICINE

## 2025-01-14 PROCEDURE — 99100 ANES PT EXTEME AGE<1 YR&>70: CPT | Performed by: ANESTHESIOLOGY

## 2025-01-14 RX ORDER — DEXTROMETHORPHAN/PSEUDOEPHED 2.5-7.5/.8
DROPS ORAL AS NEEDED
Status: COMPLETED | OUTPATIENT
Start: 2025-01-14 | End: 2025-01-14

## 2025-01-14 RX ORDER — DEXTROMETHORPHAN HYDROBROMIDE, GUAIFENESIN 5; 100 MG/5ML; MG/5ML
650 LIQUID ORAL EVERY 8 HOURS PRN
COMMUNITY

## 2025-01-14 RX ORDER — PROPOFOL 10 MG/ML
INJECTION, EMULSION INTRAVENOUS CONTINUOUS PRN
Status: DISCONTINUED | OUTPATIENT
Start: 2025-01-14 | End: 2025-01-14

## 2025-01-14 RX ORDER — ASPIRIN 81 MG/1
81 TABLET ORAL DAILY
COMMUNITY

## 2025-01-14 RX ADMIN — PROPOFOL 43 MG: 10 INJECTION, EMULSION INTRAVENOUS at 12:09

## 2025-01-14 RX ADMIN — SIMETHICONE 333 MG: 20 EMULSION ORAL at 12:29

## 2025-01-14 RX ADMIN — PROPOFOL 100 MCG/KG/MIN: 10 INJECTION, EMULSION INTRAVENOUS at 12:01

## 2025-01-14 RX ADMIN — PROPOFOL 50 MG: 10 INJECTION, EMULSION INTRAVENOUS at 12:02

## 2025-01-14 SDOH — HEALTH STABILITY: MENTAL HEALTH: CURRENT SMOKER: 0

## 2025-01-14 ASSESSMENT — PAIN SCALES - GENERAL
PAINLEVEL_OUTOF10: 0 - NO PAIN

## 2025-01-14 ASSESSMENT — PAIN - FUNCTIONAL ASSESSMENT
PAIN_FUNCTIONAL_ASSESSMENT: 0-10

## 2025-01-14 ASSESSMENT — COLUMBIA-SUICIDE SEVERITY RATING SCALE - C-SSRS
2. HAVE YOU ACTUALLY HAD ANY THOUGHTS OF KILLING YOURSELF?: NO
6. HAVE YOU EVER DONE ANYTHING, STARTED TO DO ANYTHING, OR PREPARED TO DO ANYTHING TO END YOUR LIFE?: NO
1. IN THE PAST MONTH, HAVE YOU WISHED YOU WERE DEAD OR WISHED YOU COULD GO TO SLEEP AND NOT WAKE UP?: NO

## 2025-01-14 NOTE — Clinical Note
BREA BECKHAM     [facility]    Allergies    codeine (Hives)    Intolerances        PAST MEDICAL & SURGICAL HISTORY:  Dementia of frontal lobe type      Aphasic stroke      Diabetes mellitus      Respiratory failure      Hypertension      GERD (gastroesophageal reflux disease)      Constipation      Respiratory failure      CVA (cerebral vascular accident)      HTN (hypertension)      DM (diabetes mellitus)      Advanced dementia      COVID-19 virus detected      Quadriplegia      Pneumonia      Type II diabetes mellitus      Hx of appendectomy      Gastrostomy in place      Tracheostomy in place      Tracheostomy tube present      Feeding by G-tube          FAMILY HISTORY:      Home Medications:  albuterol 90 mcg/inh inhalation aerosol with adapter: 2  inhaled every 6 hours (18 Oct 2022 11:42)  Bacid (LAC) oral tablet: 2 tab(s) by gastrostomy tube once a day (18 Oct 2022 11:42)  Betadine 10% topical swab: cleanse right and left great toes (18 Oct 2022 11:42)  chlorhexidine 0.12% mucous membrane liquid: 15 milliliter(s) mucous membrane 2 times a day (18 Oct 2022 11:42)  Eucerin topical cream: Apply topically to affected area once a day bilateral feet (18 Oct 2022 11:42)  insulin glargine 100 units/mL subcutaneous solution: 8 unit(s) subcutaneous once a day (in the morning) (18 Oct 2022 11:42)  ipratropium-albuterol 0.5 mg-2.5 mg/3 mL inhalation solution: 3 milliliter(s) inhaled 4 times a day (18 Oct 2022 11:42)  LORazepam 1 mg oral tablet: 1 tab(s) by gastrostomy tube every 4 hours (18 Oct 2022 11:42)  methocarbamol 500 mg oral tablet: 1 tab(s) by gastrostomy tube 2 times a day (18 Oct 2022 11:42)  MiraLax oral powder for reconstitution: g-tube (18 Oct 2022 13:04)  Multiple Vitamins oral tablet: 1 tab(s) orally once a day (18 Oct 2022 11:42)  nystatin 100,000 units/g topical powder: 1 application topically 3 times a day (18 Oct 2022 11:42)  omeprazole 20 mg oral delayed release capsule: orally 2 times a day (18 Oct 2022 11:42)  polyethylene glycol 3350 oral powder for reconstitution: 17 gram(s) by gastrostomy tube every 12 hours (18 Oct 2022 11:42)  senna 8.6 mg oral tablet: 3 tab(s) by gastrostomy tube once a day (at bedtime) (18 Oct 2022 11:42)  simethicone 80 mg oral tablet: g-tube (18 Oct 2022 13:04)  simethicone 80 mg oral tablet, chewable: 1 tab(s) by gastrostomy tube every 6 hours (18 Oct 2022 11:42)  sucralfate 1 g/10 mL oral suspension: 10 milliliter(s) g-tube 4 times a day (before meals and at bedtime) (18 Oct 2022 13:04)  Tylenol 325 mg oral tablet: 2 tab(s) by gastrostomy tube once a day; 60 minutes prior to dressing change  (18 Oct 2022 11:42)  Tylenol 325 mg oral tablet: 2 tab(s) by gastrostomy tube every 6 hours, As Needed (18 Oct 2022 11:42)      MEDICATIONS  (STANDING):  ALBUTerol    90 MICROgram(s) HFA Inhaler 2 Puff(s) Inhalation every 6 hours  cadexomer iodine 0.9% Gel 1 Application(s) Topical <User Schedule>  cefTRIAXone   IVPB 1000 milliGRAM(s) IV Intermittent every 24 hours  cefTRIAXone   IVPB      chlorhexidine 0.12% Liquid 15 milliLiter(s) Oral Mucosa every 12 hours  chlorhexidine 2% Cloths 1 Application(s) Topical two times a day  dexMEDEtomidine Infusion 0.5 MICROgram(s)/kG/Hr (6.14 mL/Hr) IV Continuous <Continuous>  dextrose 5%. 1000 milliLiter(s) (50 mL/Hr) IV Continuous <Continuous>  dextrose 5%. 1000 milliLiter(s) (100 mL/Hr) IV Continuous <Continuous>  dextrose 50% Injectable 25 Gram(s) IV Push once  dextrose 50% Injectable 12.5 Gram(s) IV Push once  dextrose 50% Injectable 25 Gram(s) IV Push once  glucagon  Injectable 1 milliGRAM(s) IntraMuscular once  heparin   Injectable 5000 Unit(s) SubCutaneous every 12 hours  insulin lispro (ADMELOG) corrective regimen sliding scale   SubCutaneous every 6 hours  ipratropium 17 MICROgram(s) HFA Inhaler 1 Puff(s) Inhalation every 6 hours  levoFLOXacin IVPB 750 milliGRAM(s) IV Intermittent every 48 hours  LORazepam     Tablet 1 milliGRAM(s) Oral every 6 hours  midodrine 10 milliGRAM(s) Oral every 8 hours  pantoprazole    Tablet 40 milliGRAM(s) Oral before breakfast  povidone iodine 10% Solution 1 Application(s) Topical daily  simethicone 80 milliGRAM(s) Chew every 12 hours    MEDICATIONS  (PRN):  acetaminophen     Tablet .. 650 milliGRAM(s) Oral every 6 hours PRN Temp greater or equal to 38C (100.4F), Mild Pain (1 - 3)  aluminum hydroxide/magnesium hydroxide/simethicone Suspension 30 milliLiter(s) Oral every 4 hours PRN Dyspepsia  dextrose Oral Gel 15 Gram(s) Oral once PRN Blood Glucose LESS THAN 70 milliGRAM(s)/deciliter  fentaNYL    Injectable 25 MICROGram(s) IV Push every 2 hours PRN Severe Pain (7 - 10)  LORazepam   Injectable 1 milliGRAM(s) IV Push every 4 hours PRN Agitation  melatonin 3 milliGRAM(s) Oral at bedtime PRN Insomnia  ondansetron Injectable 4 milliGRAM(s) IV Push every 8 hours PRN Nausea and/or Vomiting      Diet, NPO with Tube Feed:   Tube Feeding Modality: Gastrostomy  Nepro with Carb Steady  Total Volume for 24 Hours (mL): 800  Continuous  Starting Tube Feed Rate mL per Hour: 20  Increase Tube Feed Rate by (mL): 10     Every 4 hours  Until Goal Tube Feed Rate (mL per Hour): 40  Tube Feed Duration (in Hours): 20  Tube Feed Start Time: 16:00  Tube Feed Stop Time: 12:00  Free Water Flush  Bolus   Total Volume per Flush (mL): 250   Frequency: Every 6 Hours  Lucas(7 Gm Arginine/7 Gm Glut/1.2 Gm HMB     Qty per Day:  1 (10-19-22 @ 13:57) [Active]          Vital Signs Last 24 Hrs  T(C): 37.1 (24 Oct 2022 05:00), Max: 37.9 (23 Oct 2022 11:02)  T(F): 98.8 (24 Oct 2022 05:00), Max: 100.2 (23 Oct 2022 11:02)  HR: 74 (24 Oct 2022 10:00) (69 - 115)  BP: 112/67 (24 Oct 2022 10:00) (88/55 - 183/79)  BP(mean): 81 (24 Oct 2022 10:00) (67 - 113)  RR: 29 (24 Oct 2022 10:00) (19 - 36)  SpO2: 100% (24 Oct 2022 10:00) (86% - 100%)    Parameters below as of 24 Oct 2022 10:00  Patient On (Oxygen Delivery Method): ventilator    O2 Concentration (%): 100      10-23-22 @ 07:01  -  10-24-22 @ 07:00  --------------------------------------------------------  IN: 1590 mL / OUT: 1200 mL / NET: 390 mL    10-24-22 @ 07:01  -  10-24-22 @ 10:20  --------------------------------------------------------  IN: 160 mL / OUT: 0 mL / NET: 160 mL        Mode: AC/ CMV (Assist Control/ Continuous Mandatory Ventilation), RR (machine): 16, TV (machine): 400, FiO2: 50, PEEP: 5, ITime: 1, MAP: 16, PIP: 35      LABS:                        8.4    13.05 )-----------( 224      ( 24 Oct 2022 07:04 )             28.8     10-24    146<H>  |  114<H>  |  41<H>  ----------------------------<  230<H>  3.7   |  23  |  1.13    Ca    8.5      24 Oct 2022 07:04  Phos  3.6     10-24  Mg     2.4     10-24    TPro  6.4  /  Alb  1.6<L>  /  TBili  0.2  /  DBili  x   /  AST  12  /  ALT  18  /  AlkPhos  104  10-24              WBC:  WBC Count: 13.05 K/uL (10-24 @ 07:04)  WBC Count: 12.38 K/uL (10-23 @ 06:00)  WBC Count: 9.40 K/uL (10-22 @ 06:07)  WBC Count: 10.36 K/uL (10-21 @ 06:30)      MICROBIOLOGY:  RECENT CULTURES:  10-19 .Sputum Sputum trap Stenotrophomonas maltophilia   Few Squamous epithelial cells per low power field  Few polymorphonuclear leukocytes per low power field  Few Gram positive cocci in pairs per oil power field   Moderate Mixed gram negative rods including  Moderate Stenotrophomonas maltophilia    10-18 Clean Catch Clean Catch (Midstream) Escherichia coli XXXX   >100,000 CFU/ml Escherichia coli    10-18 .Blood Blood-Peripheral XXXX XXXX   No Growth Final                    Sodium:  Sodium, Serum: 146 mmol/L (10-24 @ 07:04)  Sodium, Serum: 146 mmol/L (10-23 @ 06:00)  Sodium, Serum: 148 mmol/L (10-22 @ 06:07)  Sodium, Serum: 145 mmol/L (10-21 @ 06:30)      1.13 mg/dL 10-24 @ 07:04  1.25 mg/dL 10-23 @ 06:00  1.18 mg/dL 10-22 @ 06:07  1.37 mg/dL 10-21 @ 06:30      Hemoglobin:  Hemoglobin: 8.4 g/dL (10-24 @ 07:04)  Hemoglobin: 8.7 g/dL (10-23 @ 06:00)  Hemoglobin: 8.0 g/dL (10-22 @ 06:07)  Hemoglobin: 7.4 g/dL (10-21 @ 06:30)      Platelets: Platelet Count - Automated: 224 K/uL (10-24 @ 07:04)  Platelet Count - Automated: 283 K/uL (10-23 @ 06:00)  Platelet Count - Automated: 235 K/uL (10-22 @ 06:07)  Platelet Count - Automated: 242 K/uL (10-21 @ 06:30)      LIVER FUNCTIONS - ( 24 Oct 2022 07:04 )  Alb: 1.6 g/dL / Pro: 6.4 g/dL / ALK PHOS: 104 U/L / ALT: 18 U/L DA / AST: 12 U/L / GGT: x                 RADIOLOGY & ADDITIONAL STUDIES:      MICROBIOLOGY:  RECENT CULTURES:  10-19 .Sputum Sputum trap Stenotrophomonas maltophilia   Few Squamous epithelial cells per low power field  Few polymorphonuclear leukocytes per low power field  Few Gram positive cocci in pairs per oil power field   Moderate Mixed gram negative rods including  Moderate Stenotrophomonas maltophilia    10-18 Clean Catch Clean Catch (Midstream) Escherichia coli XXXX   >100,000 CFU/ml Escherichia coli    10-18 .Blood Blood-Peripheral XXXX XXXX   No Growth Final             3x polyps

## 2025-01-14 NOTE — ANESTHESIA PREPROCEDURE EVALUATION
Patient: Stew Rubin    Procedure Information       Anesthesia Start Date/Time: 01/14/25 1156    Scheduled providers: Joaquina Townsend MD    Procedure: COLONOSCOPY    Location: [facility]            Relevant Problems   Cardiac   (+) A-fib (Multi)   (+) CAD (coronary artery disease)   (+) Essential hypertension   (+) History of coronary artery bypass graft   (+) Hypercholesterolemia   (+) Mixed hyperlipidemia   (+) Peripheral artery disease (CMS-HCC)      Neuro   (+) Chronic nonintractable headache   (+) Meralgia paraesthetica      /Renal   (+) Benign prostatic hyperplasia      Endocrine   (+) Class 1 obesity due to excess calories with serious comorbidity and body mass index (BMI) of 32.0 to 32.9 in adult   (+) Controlled diabetes mellitus with diabetic neuropathy, without long-term current use of insulin       Clinical information reviewed:   Tobacco  Allergies  Meds   Med Hx  Surg Hx   Fam Hx  Soc Hx        NPO Detail:  NPO/Void Status  Carbohydrate Drink Given Prior to Surgery? : N  Date of Last Liquid: 01/13/25  Time of Last Liquid: 2200  Date of Last Solid: 01/13/25  Time of Last Solid: 1700  Last Intake Type: Clear fluids  Time of Last Void: 0830         Physical Exam    Airway  Mallampati: II  TM distance: >3 FB     Cardiovascular   Rhythm: regular  Rate: normal     Dental - normal exam     Pulmonary   Breath sounds clear to auscultation     Abdominal            Anesthesia Plan    History of general anesthesia?: yes  History of complications of general anesthesia?: no    ASA 3     MAC     The patient is not a current smoker.    intravenous induction   Anesthetic plan and risks discussed with patient.    Plan discussed with [facility].

## 2025-01-14 NOTE — DISCHARGE INSTRUCTIONS
Patient Instructions after a Colonoscopy      The anesthetics, sedatives or narcotics which were given to you today will be acting in your body for the next 24 hours, so you might feel a little sleepy or groggy.  This feeling should slowly wear off. Carefully read and follow the instructions.     You received sedation today:  - Do not drive or operate any machinery or power tools of any kind.   - No alcoholic beverages today, not even beer or wine.  - Do not make any important decisions or sign any legal documents.  - No over the counter medications that contain alcohol or that may cause drowsiness.  - Do not make any important decisions or sign any legal documents.  - Make sure you have someone with you for first 24 hours.    While it is common to experience mild to moderate abdominal distention, gas, or belching after your procedure, if any of these symptoms occur following discharge from the GI Lab or within one week of having your procedure, call the [facility] to be advised whether a visit to your nearest Urgent Care or Emergency Department is indicated.  Take this paper with you if you go.     - If you develop an allergic reaction to the medications that were given during your procedure such as difficulty breathing, rash, hives, severe nausea, vomiting or lightheadedness.  - If you experience chest pain, shortness of breath, severe abdominal pain, fevers and chills.  -If you develop signs and symptoms of bleeding such as blood in your spit, if your stools turn black, tarry, or bloody  - If you have not urinated within 8 hours following your procedure.  - If your IV site becomes painful, red, inflamed, or looks infected.    If you received a biopsy/polypectomy/sphincterotomy the following instructions apply below:    __ Do not use Aspirin containing products, non-steroidal medications or anti-coagulants for one week following your procedure. (Examples of these types of medications are: Advil,  Arthrotec, Aleve, Coumadin, Ecotrin, Heparin, Ibuprofen, Indocin, Motrin, Naprosyn, Nuprin, Plavix, Vioxx, and Voltarin, or their generic forms.  This list is not all-inclusive.  Check with your physician or pharmacist before resuming medications.)   __ Eat a soft diet today.  Avoid foods that are poorly digested for the next 24 hours.  These foods would include: nuts, beans, lettuce, red meats, and fried foods. Start with liquids and advance your diet as tolerated, gradually work up to eating solids.   __ Do not have a Barium Study or Enema for one week.    Your physician recommends the additional following instructions:    -You have a contact number available for emergencies. The signs and symptoms of potential delayed complications were discussed with you. You may return to normal activities tomorrow.  -Resume your previous diet.  -Continue your present medications.   -We are waiting for your pathology results.  -Your physician has recommended a repeat colonoscopy (date to be determined after pending pathology results are reviewed) for surveillance based on pathology results.  -The findings and recommendations have been discussed with you.  -The findings and recommendations were discussed with your family.  - Please see Medication Reconciliation Form for new medication/medications prescribed.       If you experience any problems or have any questions following discharge from the GI Lab, please call:        Nurse Signature                                                                        Date___________________                                                                            Patient/Responsible Party Signature                                        Date___________________

## 2025-01-14 NOTE — ANESTHESIA POSTPROCEDURE EVALUATION
Patient: Stew Rubin    Procedure Summary       Date: 01/14/25 Room / Location: [facility]    Anesthesia Start: 1156 Anesthesia Stop: 1241    Procedure: COLONOSCOPY Diagnosis:       History of colon polyps      Adenomatous polyp    Scheduled Providers: Joaquina Townsend MD Responsible Provider: Rasta Sandoval MD    Anesthesia Type: MAC ASA Status: 3            Anesthesia Type: MAC    Vitals Value Taken Time   /82 01/14/25 1323   Temp 36 °C (96.8 °F) 01/14/25 1323   Pulse 98 01/14/25 1323   Resp 18 01/14/25 1323   SpO2 95 % 01/14/25 1323       Anesthesia Post Evaluation    Patient location during evaluation: PACU  Patient participation: complete - patient participated  Level of consciousness: awake and alert  Pain management: satisfactory to patient  Airway patency: patent  Cardiovascular status: acceptable  Respiratory status: acceptable  Hydration status: acceptable  Postoperative Nausea and Vomiting: none        There were no known notable events for this encounter.

## 2025-01-20 LAB
LABORATORY COMMENT REPORT: NORMAL
PATH REPORT.FINAL DX SPEC: NORMAL
PATH REPORT.GROSS SPEC: NORMAL
PATH REPORT.RELEVANT HX SPEC: NORMAL
PATH REPORT.TOTAL CANCER: NORMAL

## 2025-01-30 DIAGNOSIS — E78.2 MIXED HYPERLIPIDEMIA: ICD-10-CM

## 2025-01-30 RX ORDER — EZETIMIBE 10 MG/1
TABLET ORAL
Qty: 90 TABLET | Refills: 1 | Status: SHIPPED | OUTPATIENT
Start: 2025-01-30

## 2025-01-30 NOTE — TELEPHONE ENCOUNTER
Rx Refill Request Telephone Encounter    Name:  Stew Rubin  :  [id number]  ezetimibe (Zetia) 10 mg tablet [[id number]]    Order Details    Dose, Route, Frequency: As Directed   Dispense Quantity: 90 tablet Refills: 0          Sig: Take 1 Daily   Patient not taking: Reported on 2025           Specific Pharmacy location:    [id number] - [address]  [address] [facility], [facility] [address]  Phone: [phone]  Fax: [phone]  EJ #: --     Date of last appointment:  24  Date of next appointment:  NA  Best number to reach patient:  [phone]            
Inguinal hernia

## 2025-02-07 DIAGNOSIS — R41.3 MEMORY LOSS: ICD-10-CM

## 2025-02-07 RX ORDER — MEMANTINE HYDROCHLORIDE 21 MG/1
21 CAPSULE, EXTENDED RELEASE ORAL DAILY
Qty: 90 CAPSULE | Refills: 1 | Status: SHIPPED | OUTPATIENT
Start: 2025-02-07

## 2025-02-07 NOTE — TELEPHONE ENCOUNTER
Rx Refill Request Telephone Encounter    Name:  Stew Rubin  : 1945     Medication Name:  memantine (Namenda) 21 mg capsule,sprinkle,ER 24hr [[id number]]    Order Details    Dose: 21 mg Route: oral Frequency: Daily   Dispense Quantity: 90 capsule Refills: 1          Sig: Take 1 capsule (21 mg) by mouth once daily.               ALLERGIES:   see list    Specific Pharmacy location:  [id number] - [place] [id number] [place] [id number]  Phone: [phone]  Fax: [phone]  EJ #: --     Date of last appointment:  24  Date of next appointment:  25    Best number to reach patient:  [phone]

## 2025-02-17 ENCOUNTER — APPOINTMENT (OUTPATIENT)
Dept: PRIMARY CARE | Facility: CLINIC | Age: 80
End: 2025-02-17
Payer: MEDICARE

## 2025-02-24 DIAGNOSIS — I10 HYPERTENSION, UNSPECIFIED TYPE: ICD-10-CM

## 2025-02-24 RX ORDER — METOPROLOL TARTRATE 50 MG/1
50 TABLET ORAL 2 TIMES DAILY
Qty: 180 TABLET | Refills: 1 | Status: SHIPPED | OUTPATIENT
Start: 2025-02-24

## 2025-02-24 RX ORDER — DEXTROMETHORPHAN HYDROBROMIDE, GUAIFENESIN 5; 100 MG/5ML; MG/5ML
650 LIQUID ORAL EVERY 8 HOURS PRN
Qty: 30 TABLET | Refills: 1 | Status: SHIPPED | OUTPATIENT
Start: 2025-02-24

## 2025-02-24 NOTE — TELEPHONE ENCOUNTER
Rx Refill Request Telephone Encounter    Name:  Stew Rubin  : 1945     metoprolol tartrate (Lopressor) 50 mg tablet [[id number]]    Order Details    Dose: 50 mg Route: oral Frequency: 2 times daily   Dispense Quantity: 180 tablet Refills: 1          Sig: TAKE ONE TABLET BY MOUTH TWO TIMES A DAY           acetaminophen (Tylenol 8 HOUR) 650 mg ER tablet [[id number]]    Order Details  Dose: 650 mg Route: oral Frequency: Every 8 hours PRN for mild pain (1 - 3)   Dispense Quantity:  Refills:           Sig: Take 1 tablet (650 mg) by mouth every 8 hours if needed for mild pain (1 - 3). Do not crush, chew, or split.     ALLERGIES:   SEE LIST    Specific Pharmacy location:  [facility] [address]    Date of last appointment:  2024  Date of next appointment:  NONE    Best number to reach patient:  [phone]

## 2025-03-17 ENCOUNTER — APPOINTMENT (OUTPATIENT)
Dept: PRIMARY CARE | Facility: CLINIC | Age: 80
End: 2025-03-17
Payer: MEDICARE

## 2025-03-18 ENCOUNTER — OFFICE VISIT (OUTPATIENT)
Dept: PRIMARY CARE | Facility: CLINIC | Age: 80
End: 2025-03-18
Payer: MEDICARE

## 2025-03-18 VITALS
SYSTOLIC BLOOD PRESSURE: 106 MMHG | HEIGHT: 71 IN | BODY MASS INDEX: 28.98 KG/M2 | DIASTOLIC BLOOD PRESSURE: 62 MMHG | TEMPERATURE: 97.1 F | HEART RATE: 91 BPM | OXYGEN SATURATION: 99 % | WEIGHT: 207 LBS

## 2025-03-18 DIAGNOSIS — E78.2 MIXED HYPERLIPIDEMIA: ICD-10-CM

## 2025-03-18 DIAGNOSIS — I10 HYPERTENSION, UNSPECIFIED TYPE: ICD-10-CM

## 2025-03-18 DIAGNOSIS — E11.40 CONTROLLED TYPE 2 DIABETES MELLITUS WITH DIABETIC NEUROPATHY, WITHOUT LONG-TERM CURRENT USE OF INSULIN: ICD-10-CM

## 2025-03-18 DIAGNOSIS — E78.00 HYPERCHOLESTEROLEMIA: ICD-10-CM

## 2025-03-18 DIAGNOSIS — G56.03 CARPAL TUNNEL SYNDROME ON BOTH SIDES: Primary | ICD-10-CM

## 2025-03-18 DIAGNOSIS — I10 ESSENTIAL HYPERTENSION: ICD-10-CM

## 2025-03-18 DIAGNOSIS — R41.3 MEMORY LOSS: ICD-10-CM

## 2025-03-18 PROCEDURE — 1123F ACP DISCUSS/DSCN MKR DOCD: CPT | Performed by: FAMILY MEDICINE

## 2025-03-18 PROCEDURE — 1159F MED LIST DOCD IN RCRD: CPT | Performed by: FAMILY MEDICINE

## 2025-03-18 PROCEDURE — 3074F SYST BP LT 130 MM HG: CPT | Performed by: FAMILY MEDICINE

## 2025-03-18 PROCEDURE — 99213 OFFICE O/P EST LOW 20 MIN: CPT | Performed by: FAMILY MEDICINE

## 2025-03-18 PROCEDURE — 3078F DIAST BP <80 MM HG: CPT | Performed by: FAMILY MEDICINE

## 2025-03-18 RX ORDER — PRAVASTATIN SODIUM 20 MG/1
20 TABLET ORAL NIGHTLY
Qty: 90 TABLET | Refills: 1 | Status: SHIPPED | OUTPATIENT
Start: 2025-03-18

## 2025-03-18 RX ORDER — MEMANTINE HYDROCHLORIDE 21 MG/1
21 CAPSULE, EXTENDED RELEASE ORAL DAILY
Qty: 90 CAPSULE | Refills: 1 | Status: SHIPPED | OUTPATIENT
Start: 2025-03-18

## 2025-03-18 RX ORDER — DEXTROMETHORPHAN HYDROBROMIDE, GUAIFENESIN 5; 100 MG/5ML; MG/5ML
650 LIQUID ORAL EVERY 8 HOURS PRN
Qty: 30 TABLET | Refills: 1 | Status: SHIPPED | OUTPATIENT
Start: 2025-03-18

## 2025-03-18 ASSESSMENT — PATIENT HEALTH QUESTIONNAIRE - PHQ9
1. LITTLE INTEREST OR PLEASURE IN DOING THINGS: NOT AT ALL
SUM OF ALL RESPONSES TO PHQ9 QUESTIONS 1 AND 2: 0
2. FEELING DOWN, DEPRESSED OR HOPELESS: NOT AT ALL

## 2025-03-18 NOTE — PROGRESS NOTES
Subjective   Patient ID: Stew Rubin is a 80 y.o. male who presents for No chief complaint on file..  HPI  Patient presents in the office today with concerns of his head hurting due to falling last Feb in 2024. Patient states when he fell he got a concussion and the top 3 vertebrae on his neck were fractured. Pt states he is feeling fine with no head pain, just wanted to discuss this. Happened X February 2024.  Review of Systems  Constitutional:  no chills, no fever and no night sweats.  Eyes: no blurred vision and no eyesight problems.  ENT: no hearing loss, no nasal congestion, no hoarseness and no sore throat.  Neck: no mass (es) and no swelling.  Cardiovascular: no chest pain, no intermittent leg claudication, no lower extremity edema, no palpitation and no syncope.  Respiratory: no cough, no shortness of breath during exertion, no shortness of breath at rest and no wheezing.  Gastrointestinal: no abdominal pain, no blood in stools, no constipation, no diarrhea, no melena, no nausea, no rectal pain and no vomiting.  Genitourinary: no dysuria, no change in urinary frequency, no urinary hesitancy and no feelings of urinary urgency.  Musculoskeletal: no arthralgias, no back pain and no myalgias.  Integumentary: no new skin lesions and no rashes.  Neurological: no difficulty walking, no headache, no limb weakness, no numbness and no tingling.  Psychiatric/Behavioral: no anxiety, no depression, no anhedonia and no substance use disorders.  Endocrine: no recent weight gain and no recent weight loss.  Hematologic/Lymphatic: no tendency for easy bruising and no swollen glands    Objective   Physical Exam  Patient in for follow-up visit a couple of falls leg weakness has lost some weight.  Does not want to do physical therapy says she has an exercise bike at home that he is going to try using more often he started taking some protein powder and advised that that is fine as long as he keeps his fluid intake up.  Blood  pressures in good control he does not have any lightheaded or dizziness.  Chronic right-sided low back pain stable at present.  Worsening numbness and tingling in both hands prior history of remote carpal tunnel surgery is on both wrist and may be recurring.  States after he writes for period of time his hands goes numb driving both hands on the wheel they start to go numb after 5 or 10 minutes.  Will get an EMG nerve conduction study ordered for that.  Up-to-date on labs.  Needs some medications refilled well-developed well-nourished male in no acute distress reduced flexion extension of the neck prior to old injury with cervical fractures.  Reviewed with him all his lab work he is not taking colchicine has not had gout flare in years we deleted that from his current list answered all of his questions  There were no vitals taken for this visit.    Lab Results   Component Value Date    WBC 9.1 12/31/2024    HGB 11.0 (L) 12/31/2024    HCT 32.9 (L) 12/31/2024     (H) 12/31/2024     12/31/2024       Assessment/Plan plan is to follow-up after we have the test results let us know if he does not feel like he is getting stronger I would still recommend physical therapy but he is refusing to do that currently  Problem List Items Addressed This Visit    None

## 2025-04-18 DIAGNOSIS — I10 HYPERTENSION, UNSPECIFIED TYPE: ICD-10-CM

## 2025-04-18 RX ORDER — DEXTROMETHORPHAN HYDROBROMIDE, GUAIFENESIN 5; 100 MG/5ML; MG/5ML
650 LIQUID ORAL EVERY 8 HOURS PRN
Qty: 30 TABLET | Refills: 1 | Status: SHIPPED | OUTPATIENT
Start: 2025-04-18

## 2025-04-18 NOTE — TELEPHONE ENCOUNTER
Rx Refill Request Telephone Encounter    Name:  Stew Rubin  : 1945     Medication Name:  ACETAMINOPHEN ER  Dose (Optional):    650 MG  Quantity (Optional):    30  Directions (Optional):    1 TABLET EVERY 8 HOURS     ALLERGIES:   ON FILE    Specific Pharmacy location:  [facility]    Date of last appointment:  3/18/25  Date of next appointment:  NONE    Best number to reach patient:  .PHONE

## 2025-05-20 ENCOUNTER — APPOINTMENT (OUTPATIENT)
Dept: UROLOGY | Facility: CLINIC | Age: 80
End: 2025-05-20
Payer: MEDICARE

## 2025-06-16 DIAGNOSIS — E78.2 MIXED HYPERLIPIDEMIA: ICD-10-CM

## 2025-06-16 NOTE — TELEPHONE ENCOUNTER
Rx Refill Request Telephone Encounter    Name:  Stew Rubin  :  [id number]  pravastatin (Pravachol) 20 mg tablet [[id number]]    Order Details  Dose: 20 mg Route: oral Frequency: Nightly   Dispense Quantity: 90 tablet (90 day supply) Refills: 1    Duration: -- Dispense As Written: No          Sig: Take 1 tablet (20 mg) by mouth once daily at bedtime.     Specific Pharmacy location:    [facility] [id number] [place] [id number]  Phone: [phone]  Fax: [phone]  EJ #: --     Date of last appointment:  3/18/25  Date of next appointment:  na  Best number to reach patient:  [phone]

## 2025-06-17 RX ORDER — PRAVASTATIN SODIUM 20 MG/1
20 TABLET ORAL NIGHTLY
Qty: 90 TABLET | Refills: 1 | Status: SHIPPED | OUTPATIENT
Start: 2025-06-17

## 2025-06-24 DIAGNOSIS — R41.3 MEMORY LOSS: ICD-10-CM

## 2025-06-24 RX ORDER — MEMANTINE HYDROCHLORIDE 21 MG/1
21 CAPSULE, EXTENDED RELEASE ORAL DAILY
Qty: 90 CAPSULE | Refills: 1 | Status: SHIPPED | OUTPATIENT
Start: 2025-06-24

## 2025-06-24 NOTE — TELEPHONE ENCOUNTER
Rx Refill Request Telephone Encounter    Name:  Stew Rubin  : 1945     memantine (Namenda) 21 mg capsule,sprinkle,ER 24hr [[id number]]    Order Details  Dose: 21 mg Route: oral Frequency: Daily   Dispense Quantity: 90 capsule (90 day supply) Refills: 1    Duration: -- Dispense As Written: No          Sig: Take 1 capsule (21 mg) by mouth once daily.       ALLERGIES:   SEE LIST    Specific Pharmacy location:  [facility]    Date of last appointment:  2025  Date of next appointment:  NONE    Best number to reach patient:  [phone]

## 2025-06-30 DIAGNOSIS — E87.6 HYPOKALEMIA: ICD-10-CM

## 2025-06-30 RX ORDER — POTASSIUM CHLORIDE 20 MEQ/1
20 TABLET, EXTENDED RELEASE ORAL DAILY
Qty: 90 TABLET | Refills: 1 | Status: SHIPPED | OUTPATIENT
Start: 2025-06-30

## 2025-06-30 NOTE — TELEPHONE ENCOUNTER
Rx Refill Request Telephone Encounter    COMPLETELY OUT OF THIS MEDICATION  Name:  Stew Rubin  :  [id number]  potassium chloride CR (Klor-Con M20) 20 mEq ER tablet [[id number]]    Order Details  Dose: 20 mEq Route: oral Frequency: Daily   Dispense Quantity: 90 tablet (90 day supply) Refills: 0    Duration: -- Dispense As Written: No          Sig: Take 1 tablet (20 mEq) by mouth once daily. Do not crush or chew.     Specific Pharmacy location:    [facility] [id number] [place] [id number]  Phone: [phone]  Fax: [phone]  EJ #: --     Date of last appointment:  3/18/25  Date of next appointment:  25  Best number to reach patient:  [phone]

## 2025-07-01 ENCOUNTER — OFFICE VISIT (OUTPATIENT)
Dept: PRIMARY CARE | Facility: CLINIC | Age: 80
End: 2025-07-01
Payer: MEDICARE

## 2025-07-01 VITALS
HEART RATE: 70 BPM | TEMPERATURE: 97.8 F | DIASTOLIC BLOOD PRESSURE: 80 MMHG | BODY MASS INDEX: 31.08 KG/M2 | OXYGEN SATURATION: 99 % | HEIGHT: 71 IN | SYSTOLIC BLOOD PRESSURE: 110 MMHG | WEIGHT: 222 LBS

## 2025-07-01 DIAGNOSIS — F02.80 DEMENTIA IN OTHER DISEASES CLASSIFIED ELSEWHERE, UNSPECIFIED SEVERITY, WITHOUT BEHAVIORAL DISTURBANCE, PSYCHOTIC DISTURBANCE, MOOD DISTURBANCE, AND ANXIETY (MULTI): ICD-10-CM

## 2025-07-01 DIAGNOSIS — E11.40 CONTROLLED TYPE 2 DIABETES MELLITUS WITH DIABETIC NEUROPATHY, WITHOUT LONG-TERM CURRENT USE OF INSULIN: ICD-10-CM

## 2025-07-01 DIAGNOSIS — I50.32 CHRONIC DIASTOLIC (CONGESTIVE) HEART FAILURE: ICD-10-CM

## 2025-07-01 DIAGNOSIS — E66.811 CLASS 1 OBESITY DUE TO EXCESS CALORIES WITH SERIOUS COMORBIDITY AND BODY MASS INDEX (BMI) OF 30.0 TO 30.9 IN ADULT: ICD-10-CM

## 2025-07-01 DIAGNOSIS — I47.9 PAROXYSMAL TACHYCARDIA, UNSPECIFIED (MULTI): ICD-10-CM

## 2025-07-01 DIAGNOSIS — N18.31 CHRONIC KIDNEY DISEASE, STAGE 3A (MULTI): ICD-10-CM

## 2025-07-01 DIAGNOSIS — L23.7 ALLERGIC CONTACT DERMATITIS DUE TO PLANTS, EXCEPT FOOD: Primary | ICD-10-CM

## 2025-07-01 DIAGNOSIS — F02.80 ALZHEIMER DISEASE (MULTI): ICD-10-CM

## 2025-07-01 DIAGNOSIS — G95.9 MYELOPATHY (MULTI): ICD-10-CM

## 2025-07-01 DIAGNOSIS — I50.32 CHRONIC DIASTOLIC CONGESTIVE HEART FAILURE: ICD-10-CM

## 2025-07-01 DIAGNOSIS — I48.91 ATRIAL FIBRILLATION, UNSPECIFIED TYPE (MULTI): ICD-10-CM

## 2025-07-01 DIAGNOSIS — I48.19 OTHER PERSISTENT ATRIAL FIBRILLATION: ICD-10-CM

## 2025-07-01 DIAGNOSIS — I48.0 PAROXYSMAL ATRIAL FIBRILLATION (MULTI): ICD-10-CM

## 2025-07-01 DIAGNOSIS — E66.09 CLASS 1 OBESITY DUE TO EXCESS CALORIES WITH SERIOUS COMORBIDITY AND BODY MASS INDEX (BMI) OF 30.0 TO 30.9 IN ADULT: ICD-10-CM

## 2025-07-01 DIAGNOSIS — I10 ESSENTIAL HYPERTENSION: ICD-10-CM

## 2025-07-01 DIAGNOSIS — E78.2 MIXED HYPERLIPIDEMIA: ICD-10-CM

## 2025-07-01 DIAGNOSIS — I50.20 SYSTOLIC HEART FAILURE, UNSPECIFIED HF CHRONICITY: ICD-10-CM

## 2025-07-01 DIAGNOSIS — G30.9 ALZHEIMER DISEASE (MULTI): ICD-10-CM

## 2025-07-01 DIAGNOSIS — I11.0 HYPERTENSIVE HEART DISEASE WITH HEART FAILURE: ICD-10-CM

## 2025-07-01 DIAGNOSIS — C61 PROSTATE CANCER (MULTI): ICD-10-CM

## 2025-07-01 PROCEDURE — 1036F TOBACCO NON-USER: CPT | Performed by: FAMILY MEDICINE

## 2025-07-01 PROCEDURE — 99213 OFFICE O/P EST LOW 20 MIN: CPT | Performed by: FAMILY MEDICINE

## 2025-07-01 PROCEDURE — 1159F MED LIST DOCD IN RCRD: CPT | Performed by: FAMILY MEDICINE

## 2025-07-01 PROCEDURE — 3074F SYST BP LT 130 MM HG: CPT | Performed by: FAMILY MEDICINE

## 2025-07-01 PROCEDURE — 3079F DIAST BP 80-89 MM HG: CPT | Performed by: FAMILY MEDICINE

## 2025-07-01 NOTE — PROGRESS NOTES
Subjective   Patient ID: Stew Rubin is a 80 y.o. male who presents for Back Pain, Allergies, and Rash.  HPI    Patient complaint of back pain. He has not been able to get in touch of with his orthopedic surgeon. He was told that she needed at least 7 sessions of physically therapy which he did. He was then given a CT of his whole back and has yet to hear anything.     Patient complaints of nasal drainage and watery eyes. Ongoing x 4-5 months.     Patient complaints of a rash ongoing x 8 weeks ago. Patient states that she found a rash cortisone cream 2 weeks ago that has been giving him relief.       Review of Systems  Constitutional:  no chills, no fever and no night sweats.  Eyes: no blurred vision and no eyesight problems.  ENT: no hearing loss, no nasal congestion, no hoarseness and no sore throat.  Neck: no mass (es) and no swelling.  Cardiovascular: no chest pain, no intermittent leg claudication, no lower extremity edema, no palpitation and no syncope.  Respiratory: no cough, no shortness of breath during exertion, no shortness of breath at rest and no wheezing.  Gastrointestinal: no abdominal pain, no blood in stools, no constipation, no diarrhea, no melena, no nausea, no rectal pain and no vomiting.  Genitourinary: no dysuria, no change in urinary frequency, no urinary hesitancy and no feelings of urinary urgency.  Musculoskeletal: no arthralgias, no back pain and no myalgias.  Integumentary: no new skin lesions and no rashes.  Neurological: no difficulty walking, no headache, no limb weakness, no numbness and no tingling.  Psychiatric/Behavioral: no anxiety, no depression, no anhedonia and no substance use disorders.  Endocrine: no recent weight gain and no recent weight loss.  Hematologic/Lymphatic: no tendency for easy bruising and no swollen glands    Objective   Physical Exam  Patient has a pruritic rash possible contact dermatitis on his forearms scratching at it and he has some scabs.  No blisters.  " Not sure what he came into contact with no new changes in soaps or lotions.  History of spinal stenosis previous surgeries supposed to see a doctor [facility] to see if anything else can be done was told he needed to complete 7 PT treatments which she did was referred back been unable to get a hold of them he asked that we make a phone call to see if they will call him back we will call their office and ask if they can please contact the patient to see what his next step is.  Having more difficulty with focus and concentration answers questions appropriately but sometimes uses the wrong word and to take time to think about his answer to mention may be starting to worsen.  /80 (BP Location: Left arm, Patient Position: Sitting, BP Cuff Size: Adult long)   Pulse 70   Temp 36.6 °C (97.8 °F) (Temporal)   Ht 1.803 m (5' 11\")   Wt 101 kg (222 lb)   SpO2 99%   BMI 30.96 kg/m²     Lab Results   Component Value Date    WBC 9.1 12/31/2024    HGB 11.0 (L) 12/31/2024    HCT 32.9 (L) 12/31/2024     (H) 12/31/2024     12/31/2024       Assessment/Plan plan is to await findings from surgeon he is given 80 Kenalog for the rash and follow-up with us if he develops other problems.  Problem List Items Addressed This Visit       Controlled diabetes mellitus with diabetic neuropathy, without long-term current use of insulin    Essential hypertension    Mixed hyperlipidemia     Other Visit Diagnoses         BMI 30.0-30.9,adult          Class 1 obesity due to excess calories with serious comorbidity and body mass index (BMI) of 30.0 to 30.9 in adult              "

## 2025-07-11 ENCOUNTER — TELEPHONE (OUTPATIENT)
Dept: PRIMARY CARE | Facility: CLINIC | Age: 80
End: 2025-07-11
Payer: MEDICARE

## 2025-07-11 NOTE — TELEPHONE ENCOUNTER
Patient is calling because he states you were going to reach out to Dr Avila. He states he's the top vertebrae surgeon at the [facility]. Stew just saw you recently on 7/1/25 for back pain.     He just wanted to know if you got in contact with this doctor or not    Please advise  Thanks    Patient's # [phone]

## 2025-07-15 ENCOUNTER — OFFICE VISIT (OUTPATIENT)
Dept: PRIMARY CARE | Facility: CLINIC | Age: 80
End: 2025-07-15
Payer: MEDICARE

## 2025-07-15 VITALS
OXYGEN SATURATION: 97 % | WEIGHT: 224 LBS | SYSTOLIC BLOOD PRESSURE: 148 MMHG | HEART RATE: 94 BPM | BODY MASS INDEX: 31.36 KG/M2 | HEIGHT: 71 IN | DIASTOLIC BLOOD PRESSURE: 94 MMHG | TEMPERATURE: 97.8 F

## 2025-07-15 DIAGNOSIS — E66.1 CLASS 1 DRUG-INDUCED OBESITY WITHOUT SERIOUS COMORBIDITY WITH BODY MASS INDEX (BMI) OF 31.0 TO 31.9 IN ADULT: ICD-10-CM

## 2025-07-15 DIAGNOSIS — E78.2 MIXED HYPERLIPIDEMIA: ICD-10-CM

## 2025-07-15 DIAGNOSIS — G44.209 MUSCLE TENSION HEADACHE: ICD-10-CM

## 2025-07-15 DIAGNOSIS — M54.2 NECK PAIN: Primary | ICD-10-CM

## 2025-07-15 DIAGNOSIS — E66.811 CLASS 1 DRUG-INDUCED OBESITY WITHOUT SERIOUS COMORBIDITY WITH BODY MASS INDEX (BMI) OF 31.0 TO 31.9 IN ADULT: ICD-10-CM

## 2025-07-15 PROCEDURE — 3080F DIAST BP >= 90 MM HG: CPT | Performed by: FAMILY MEDICINE

## 2025-07-15 PROCEDURE — 99213 OFFICE O/P EST LOW 20 MIN: CPT | Performed by: FAMILY MEDICINE

## 2025-07-15 PROCEDURE — 3077F SYST BP >= 140 MM HG: CPT | Performed by: FAMILY MEDICINE

## 2025-07-15 PROCEDURE — 1036F TOBACCO NON-USER: CPT | Performed by: FAMILY MEDICINE

## 2025-07-15 PROCEDURE — 1159F MED LIST DOCD IN RCRD: CPT | Performed by: FAMILY MEDICINE

## 2025-07-15 RX ORDER — CYCLOBENZAPRINE HCL 5 MG
5 TABLET ORAL 3 TIMES DAILY PRN
Qty: 30 TABLET | Refills: 2 | Status: SHIPPED | OUTPATIENT
Start: 2025-07-15 | End: 2026-03-12

## 2025-07-15 RX ORDER — EZETIMIBE 10 MG/1
TABLET ORAL
Qty: 90 TABLET | Refills: 1 | Status: SHIPPED | OUTPATIENT
Start: 2025-07-15

## 2025-07-15 RX ORDER — PREDNISONE 10 MG/1
TABLET ORAL
Qty: 51 TABLET | Refills: 0 | Status: SHIPPED | OUTPATIENT
Start: 2025-07-15

## 2025-07-15 ASSESSMENT — ENCOUNTER SYMPTOMS: DEPRESSION: 0

## 2025-07-15 ASSESSMENT — PATIENT HEALTH QUESTIONNAIRE - PHQ9
2. FEELING DOWN, DEPRESSED OR HOPELESS: NOT AT ALL
1. LITTLE INTEREST OR PLEASURE IN DOING THINGS: NOT AT ALL
SUM OF ALL RESPONSES TO PHQ9 QUESTIONS 1 AND 2: 0

## 2025-07-15 NOTE — PROGRESS NOTES
Subjective   Patient ID: Stew Rubin is a 80 y.o. male who presents for Pain in Head.  HPI    Patient presents in office for head pain. Patient states he doesn't generally get headaches but has been experiencing pain on the left side of head from the back to the front. Patient states its a sharp pain that comes out of no where. Patient states its not consistent and that it comes and goes. He says this pain is not like any other pain he has ever felt before. Ongoing x 2 weeks.     Review of Systems  Constitutional:  no chills, no fever and no night sweats.  Eyes: no blurred vision and no eyesight problems.  ENT: no hearing loss, no nasal congestion, no hoarseness and no sore throat.  Neck: no mass (es) and no swelling.  Cardiovascular: no chest pain, no intermittent leg claudication, no lower extremity edema, no palpitation and no syncope.  Respiratory: no cough, no shortness of breath during exertion, no shortness of breath at rest and no wheezing.  Gastrointestinal: no abdominal pain, no blood in stools, no constipation, no diarrhea, no melena, no nausea, no rectal pain and no vomiting.  Genitourinary: no dysuria, no change in urinary frequency, no urinary hesitancy and no feelings of urinary urgency.  Musculoskeletal: no arthralgias, no back pain and no myalgias.  Integumentary: no new skin lesions and no rashes.  Neurological: no difficulty walking, no headache, no limb weakness, no numbness and no tingling.  Psychiatric/Behavioral: no anxiety, no depression, no anhedonia and no substance use disorders.  Endocrine: no recent weight gain and no recent weight loss.  Hematologic/Lymphatic: no tendency for easy bruising and no swollen glands    Objective   Physical Exam  Patient here today with complaints of pain in his left side of his neck radiating to left side of the head causing headache.  This been going on and intermittently for the last 2 weeks no trauma no increased physical activity.  He has a history of  "restricted range of motion in the neck from arthritis.  He has pain and tenderness in the paracervical musculature on the left side radiating up the back of the skull and into the left temporal parietal area.  Pupils equal react light and accommodation extract muscles intact no neurologic deficits.  BP (!) 148/94 (BP Location: Left arm, Patient Position: Sitting, BP Cuff Size: Adult)   Pulse 94   Temp 36.6 °C (97.8 °F) (Temporal)   Ht 1.803 m (5' 11\")   Wt 102 kg (224 lb)   SpO2 97%   BMI 31.24 kg/m²     Lab Results   Component Value Date    WBC 9.1 12/31/2024    HGB 11.0 (L) 12/31/2024    HCT 32.9 (L) 12/31/2024     (H) 12/31/2024     12/31/2024       Assessment/Plan probable muscle tension headaches try Nguyen burst and taper prednisone and Flexeril 2-3 times a day.  Still trying to get a hold of his surgeon from the [facility] who ordered the CT of his lumbar spine and still having pain radiating in the legs weakness on the right side advised him if they want call us and they would not they did not call us back either he should just go to his office and demanded to be seen.  He will let us know if this does not solve the headache.  Problem List Items Addressed This Visit          Cardiac and Vasculature    Mixed hyperlipidemia     Other Visit Diagnoses         BMI 31.0-31.9,adult          Class 1 drug-induced obesity without serious comorbidity with body mass index (BMI) of 31.0 to 31.9 in adult              "

## 2025-08-14 DIAGNOSIS — E87.6 HYPOKALEMIA: ICD-10-CM

## 2025-08-14 RX ORDER — POTASSIUM CHLORIDE 20 MEQ/1
20 TABLET, EXTENDED RELEASE ORAL DAILY
Qty: 90 TABLET | Refills: 1 | Status: SHIPPED | OUTPATIENT
Start: 2025-08-14

## 2025-08-29 ENCOUNTER — CLINICAL SUPPORT (OUTPATIENT)
Dept: AUDIOLOGY | Facility: CLINIC | Age: 80
End: 2025-08-29
Payer: MEDICARE

## 2025-08-29 ENCOUNTER — OFFICE VISIT (OUTPATIENT)
Dept: PRIMARY CARE | Facility: CLINIC | Age: 80
End: 2025-08-29
Payer: MEDICARE

## 2025-08-29 VITALS
SYSTOLIC BLOOD PRESSURE: 112 MMHG | HEART RATE: 79 BPM | HEIGHT: 72 IN | DIASTOLIC BLOOD PRESSURE: 70 MMHG | OXYGEN SATURATION: 99 % | BODY MASS INDEX: 30.48 KG/M2 | WEIGHT: 225 LBS

## 2025-08-29 DIAGNOSIS — H91.92 HEARING LOSS OF LEFT EAR, UNSPECIFIED HEARING LOSS TYPE: Primary | ICD-10-CM

## 2025-08-29 DIAGNOSIS — H90.3 ASYMMETRICAL SENSORINEURAL HEARING LOSS: Primary | ICD-10-CM

## 2025-08-29 PROCEDURE — 92567 TYMPANOMETRY: CPT

## 2025-08-29 PROCEDURE — 1159F MED LIST DOCD IN RCRD: CPT | Performed by: FAMILY MEDICINE

## 2025-08-29 PROCEDURE — 99213 OFFICE O/P EST LOW 20 MIN: CPT | Performed by: FAMILY MEDICINE

## 2025-08-29 PROCEDURE — 92557 COMPREHENSIVE HEARING TEST: CPT

## 2025-08-29 PROCEDURE — 1036F TOBACCO NON-USER: CPT | Performed by: FAMILY MEDICINE

## 2025-08-29 PROCEDURE — 3078F DIAST BP <80 MM HG: CPT | Performed by: FAMILY MEDICINE

## 2025-08-29 PROCEDURE — 3074F SYST BP LT 130 MM HG: CPT | Performed by: FAMILY MEDICINE

## 2025-08-30 DIAGNOSIS — H91.20 SUDDEN-ONSET SENSORINEURAL HEARING LOSS: Primary | ICD-10-CM

## 2025-08-30 RX ORDER — PREDNISONE 20 MG/1
TABLET ORAL
Qty: 28 TABLET | Refills: 0 | Status: SHIPPED | OUTPATIENT
Start: 2025-08-30 | End: 2025-09-12

## 2025-09-04 ENCOUNTER — APPOINTMENT (OUTPATIENT)
Dept: AUDIOLOGY | Facility: CLINIC | Age: 80
End: 2025-09-04
Payer: MEDICARE

## 2025-09-04 ENCOUNTER — OFFICE VISIT (OUTPATIENT)
Dept: OTOLARYNGOLOGY | Facility: CLINIC | Age: 80
End: 2025-09-04
Payer: MEDICARE

## 2025-09-04 DIAGNOSIS — H91.20 SUDDEN-ONSET SENSORINEURAL HEARING LOSS: Primary | ICD-10-CM

## 2025-09-04 PROCEDURE — 1160F RVW MEDS BY RX/DR IN RCRD: CPT | Performed by: OTOLARYNGOLOGY

## 2025-09-04 PROCEDURE — 99203 OFFICE O/P NEW LOW 30 MIN: CPT | Performed by: OTOLARYNGOLOGY

## 2025-09-04 PROCEDURE — 69801 INCISE INNER EAR: CPT | Performed by: OTOLARYNGOLOGY

## 2025-09-04 PROCEDURE — 1036F TOBACCO NON-USER: CPT | Performed by: OTOLARYNGOLOGY

## 2025-09-04 PROCEDURE — 1159F MED LIST DOCD IN RCRD: CPT | Performed by: OTOLARYNGOLOGY

## 2025-09-04 RX ORDER — PREDNISONE 20 MG/1
TABLET ORAL
Qty: 10 TABLET | Refills: 0 | Status: SHIPPED | OUTPATIENT
Start: 2025-09-04 | End: 2025-09-12

## 2025-09-11 ENCOUNTER — APPOINTMENT (OUTPATIENT)
Dept: OTOLARYNGOLOGY | Facility: CLINIC | Age: 80
End: 2025-09-11
Payer: MEDICARE

## 2025-09-18 ENCOUNTER — APPOINTMENT (OUTPATIENT)
Dept: OTOLARYNGOLOGY | Facility: CLINIC | Age: 80
End: 2025-09-18
Payer: MEDICARE

## (undated) DEVICE — Device: Brand: ENDO SMARTCAP

## (undated) DEVICE — ENDO CARRY-ON PROCEDURE KIT: Brand: ENDO CARRY-ON PROCEDURE KIT

## (undated) DEVICE — PATIENT RETURN ELECTRODE, SINGLE-USE, NON CONTACT QUALITY MONITORING, ADULT, WITH 9 FT (2.7 M) CORD, FOR PATIENTS WEIGHING OVER 33LBS. (15KG): Brand: MEGADYNE

## (undated) DEVICE — CATHETER SCLERO L240CM NDL 25GA L4MM SHTH DIA2.3MM CNTRST

## (undated) DEVICE — TUBE SET 96 MM 64 MM H2O PERISTALTIC STD AUX CHANNEL

## (undated) DEVICE — BRUSH ENDO CLN L90.5IN SHTH DIA1.7MM BRIST DIA5-7MM 2-6MM

## (undated) DEVICE — ELECTRODE PT RET AD L9FT HI MOIST COND ADH HYDRGEL CORDED

## (undated) DEVICE — SINGLE PORT MANIFOLD: Brand: NEPTUNE 2

## (undated) DEVICE — TUBING, SUCTION, 1/4" X 10', STRAIGHT: Brand: MEDLINE

## (undated) DEVICE — SNARE HEX 2.4X13MM

## (undated) DEVICE — SUBMUCOSAL LIFTING AGENT: Brand: ORISE™ GEL